# Patient Record
Sex: MALE | Race: WHITE | NOT HISPANIC OR LATINO | Employment: OTHER | URBAN - METROPOLITAN AREA
[De-identification: names, ages, dates, MRNs, and addresses within clinical notes are randomized per-mention and may not be internally consistent; named-entity substitution may affect disease eponyms.]

---

## 2017-09-14 ENCOUNTER — HOSPITAL ENCOUNTER (OUTPATIENT)
Facility: MEDICAL CENTER | Age: 57
End: 2017-09-14
Payer: COMMERCIAL

## 2017-09-14 LAB
ALBUMIN SERPL BCP-MCNC: 4 G/DL (ref 3.2–4.9)
ALBUMIN/GLOB SERPL: 1.5 G/DL
ALP SERPL-CCNC: 63 U/L (ref 30–99)
ALT SERPL-CCNC: 24 U/L (ref 2–50)
ANION GAP SERPL CALC-SCNC: 8 MMOL/L (ref 0–11.9)
AST SERPL-CCNC: 17 U/L (ref 12–45)
BDY FAT % MEASURED: 28.6 %
BILIRUB SERPL-MCNC: 0.4 MG/DL (ref 0.1–1.5)
BP DIAS: 88 MMHG
BP SYS: 120 MMHG
BUN SERPL-MCNC: 19 MG/DL (ref 8–22)
CALCIUM SERPL-MCNC: 9.3 MG/DL (ref 8.5–10.5)
CHLORIDE SERPL-SCNC: 107 MMOL/L (ref 96–112)
CHOLEST SERPL-MCNC: 204 MG/DL (ref 100–199)
CO2 SERPL-SCNC: 27 MMOL/L (ref 20–33)
CREAT SERPL-MCNC: 0.9 MG/DL (ref 0.5–1.4)
DIABETES HTDIA: NO
EVENT NAME HTEVT: NORMAL
GFR SERPL CREATININE-BSD FRML MDRD: >60 ML/MIN/1.73 M 2
GLOBULIN SER CALC-MCNC: 2.7 G/DL (ref 1.9–3.5)
GLUCOSE SERPL-MCNC: 98 MG/DL (ref 65–99)
HDLC SERPL-MCNC: 60 MG/DL
HYPERTENSION HTHYP: NO
LDLC SERPL CALC-MCNC: 116 MG/DL
POTASSIUM SERPL-SCNC: 4.5 MMOL/L (ref 3.6–5.5)
PROT SERPL-MCNC: 6.7 G/DL (ref 6–8.2)
SCREENING LOC CITY HTCIT: NORMAL
SCREENING LOC STATE HTSTA: NORMAL
SCREENING LOCATION HTLOC: NORMAL
SODIUM SERPL-SCNC: 142 MMOL/L (ref 135–145)
SUBSCRIBER ID HTSID: NORMAL
TRIGL SERPL-MCNC: 141 MG/DL (ref 0–149)

## 2018-09-13 ENCOUNTER — HOSPITAL ENCOUNTER (OUTPATIENT)
Facility: MEDICAL CENTER | Age: 58
End: 2018-09-13
Payer: COMMERCIAL

## 2018-09-14 LAB
BDY FAT % MEASURED: 31.6 %
BP DIAS: 78 MMHG
BP SYS: 118 MMHG
DIABETES HTDIA: NO
EVENT NAME HTEVT: NORMAL
HYPERTENSION HTHYP: NO
SCREENING LOC CITY HTCIT: NORMAL
SCREENING LOC STATE HTSTA: NORMAL
SCREENING LOCATION HTLOC: NORMAL
SUBSCRIBER ID HTSID: NORMAL

## 2018-09-15 LAB
CHOLEST SERPL-MCNC: 260 MG/DL (ref 100–199)
FASTING STATUS PATIENT QL REPORTED: NORMAL
GLUCOSE SERPL-MCNC: 94 MG/DL (ref 65–99)
HDLC SERPL-MCNC: 54 MG/DL
LDLC SERPL CALC-MCNC: 157 MG/DL
TRIGL SERPL-MCNC: 246 MG/DL (ref 0–149)

## 2024-09-04 ENCOUNTER — APPOINTMENT (RX ONLY)
Dept: URBAN - METROPOLITAN AREA CLINIC 38 | Facility: CLINIC | Age: 64
Setting detail: DERMATOLOGY
End: 2024-09-04

## 2024-09-04 DIAGNOSIS — D18.0 HEMANGIOMA: ICD-10-CM

## 2024-09-04 DIAGNOSIS — D22 MELANOCYTIC NEVI: ICD-10-CM

## 2024-09-04 DIAGNOSIS — L82.1 OTHER SEBORRHEIC KERATOSIS: ICD-10-CM

## 2024-09-04 DIAGNOSIS — T07XXXA ABRASION OR FRICTION BURN OF OTHER, MULTIPLE, AND UNSPECIFIED SITES, WITHOUT MENTION OF INFECTION: ICD-10-CM

## 2024-09-04 DIAGNOSIS — L81.4 OTHER MELANIN HYPERPIGMENTATION: ICD-10-CM

## 2024-09-04 DIAGNOSIS — D69.2 OTHER NONTHROMBOCYTOPENIC PURPURA: ICD-10-CM

## 2024-09-04 DIAGNOSIS — Z71.89 OTHER SPECIFIED COUNSELING: ICD-10-CM

## 2024-09-04 PROBLEM — D18.01 HEMANGIOMA OF SKIN AND SUBCUTANEOUS TISSUE: Status: ACTIVE | Noted: 2024-09-04

## 2024-09-04 PROBLEM — S80.921A UNSPECIFIED SUPERFICIAL INJURY OF RIGHT LOWER LEG, INITIAL ENCOUNTER: Status: ACTIVE | Noted: 2024-09-04

## 2024-09-04 PROBLEM — D22.5 MELANOCYTIC NEVI OF TRUNK: Status: ACTIVE | Noted: 2024-09-04

## 2024-09-04 PROCEDURE — 99203 OFFICE O/P NEW LOW 30 MIN: CPT

## 2024-09-04 PROCEDURE — ? COUNSELING

## 2024-09-04 ASSESSMENT — LOCATION SIMPLE DESCRIPTION DERM
LOCATION SIMPLE: RIGHT FOREARM
LOCATION SIMPLE: POSTERIOR NECK
LOCATION SIMPLE: INFERIOR FOREHEAD
LOCATION SIMPLE: RIGHT FOREHEAD
LOCATION SIMPLE: LEFT 2ND TOE
LOCATION SIMPLE: LEFT FOREARM
LOCATION SIMPLE: LEFT ANTERIOR NECK
LOCATION SIMPLE: RIGHT PRETIBIAL REGION
LOCATION SIMPLE: RIGHT 2ND TOE
LOCATION SIMPLE: SCALP
LOCATION SIMPLE: ABDOMEN
LOCATION SIMPLE: RIGHT UPPER BACK
LOCATION SIMPLE: LEFT LOWER BACK

## 2024-09-04 ASSESSMENT — LOCATION DETAILED DESCRIPTION DERM
LOCATION DETAILED: LEFT SUPERIOR LATERAL LOWER BACK
LOCATION DETAILED: RIGHT 2ND TOENAIL
LOCATION DETAILED: RIGHT INFERIOR UPPER BACK
LOCATION DETAILED: LEFT DISTAL DORSAL FOREARM
LOCATION DETAILED: RIGHT PROXIMAL PRETIBIAL REGION
LOCATION DETAILED: RIGHT PROXIMAL DORSAL FOREARM
LOCATION DETAILED: RIGHT MEDIAL TRAPEZIAL NECK
LOCATION DETAILED: PERIUMBILICAL SKIN
LOCATION DETAILED: RIGHT SUPERIOR FOREHEAD
LOCATION DETAILED: LEFT CLAVICULAR NECK
LOCATION DETAILED: LEFT 2ND TOENAIL
LOCATION DETAILED: INFERIOR MID FOREHEAD
LOCATION DETAILED: LEFT SUPERIOR PARIETAL SCALP

## 2024-09-04 ASSESSMENT — LOCATION ZONE DERM
LOCATION ZONE: TRUNK
LOCATION ZONE: TOENAIL
LOCATION ZONE: ARM
LOCATION ZONE: NECK
LOCATION ZONE: FACE
LOCATION ZONE: SCALP
LOCATION ZONE: LEG

## 2024-11-03 ENCOUNTER — HOSPITAL ENCOUNTER (OUTPATIENT)
Facility: MEDICAL CENTER | Age: 64
End: 2024-11-04
Attending: HOSPITALIST | Admitting: HOSPITALIST
Payer: COMMERCIAL

## 2024-11-03 PROBLEM — R07.89 ATYPICAL CHEST PAIN: Status: ACTIVE | Noted: 2024-11-03

## 2024-11-03 PROBLEM — R07.9 CHEST PAIN: Status: ACTIVE | Noted: 2024-11-03

## 2024-11-03 LAB
ANION GAP SERPL CALC-SCNC: 9 MMOL/L (ref 7–16)
BUN SERPL-MCNC: 28 MG/DL (ref 8–22)
CALCIUM SERPL-MCNC: 9 MG/DL (ref 8.4–10.2)
CHLORIDE SERPL-SCNC: 104 MMOL/L (ref 96–112)
CO2 SERPL-SCNC: 23 MMOL/L (ref 20–33)
CREAT SERPL-MCNC: 1.03 MG/DL (ref 0.5–1.4)
GFR SERPLBLD CREATININE-BSD FMLA CKD-EPI: 81 ML/MIN/1.73 M 2
GLUCOSE SERPL-MCNC: 114 MG/DL (ref 65–99)
POTASSIUM SERPL-SCNC: 4.6 MMOL/L (ref 3.6–5.5)
SODIUM SERPL-SCNC: 136 MMOL/L (ref 135–145)
TROPONIN T SERPL-MCNC: 107 NG/L (ref 6–19)

## 2024-11-03 PROCEDURE — 94760 N-INVAS EAR/PLS OXIMETRY 1: CPT

## 2024-11-03 PROCEDURE — G0378 HOSPITAL OBSERVATION PER HR: HCPCS

## 2024-11-03 PROCEDURE — 80048 BASIC METABOLIC PNL TOTAL CA: CPT

## 2024-11-03 PROCEDURE — 99292 CRITICAL CARE ADDL 30 MIN: CPT | Performed by: HOSPITALIST

## 2024-11-03 PROCEDURE — 84484 ASSAY OF TROPONIN QUANT: CPT

## 2024-11-03 PROCEDURE — 93005 ELECTROCARDIOGRAM TRACING: CPT | Performed by: HOSPITALIST

## 2024-11-03 PROCEDURE — 36415 COLL VENOUS BLD VENIPUNCTURE: CPT

## 2024-11-03 PROCEDURE — 96375 TX/PRO/DX INJ NEW DRUG ADDON: CPT

## 2024-11-03 PROCEDURE — 700111 HCHG RX REV CODE 636 W/ 250 OVERRIDE (IP): Mod: JZ | Performed by: HOSPITALIST

## 2024-11-03 PROCEDURE — 99291 CRITICAL CARE FIRST HOUR: CPT | Performed by: HOSPITALIST

## 2024-11-03 PROCEDURE — 93005 ELECTROCARDIOGRAM TRACING: CPT | Performed by: EMERGENCY MEDICINE

## 2024-11-03 PROCEDURE — 96365 THER/PROPH/DIAG IV INF INIT: CPT

## 2024-11-03 RX ORDER — PROCHLORPERAZINE EDISYLATE 5 MG/ML
5-10 INJECTION INTRAMUSCULAR; INTRAVENOUS EVERY 4 HOURS PRN
Status: DISCONTINUED | OUTPATIENT
Start: 2024-11-03 | End: 2024-11-04 | Stop reason: HOSPADM

## 2024-11-03 RX ORDER — MORPHINE SULFATE 4 MG/ML
2 INJECTION INTRAVENOUS
Status: DISCONTINUED | OUTPATIENT
Start: 2024-11-03 | End: 2024-11-04

## 2024-11-03 RX ORDER — ACETAMINOPHEN 325 MG/1
650 TABLET ORAL EVERY 6 HOURS PRN
Status: CANCELLED | OUTPATIENT
Start: 2024-11-03

## 2024-11-03 RX ORDER — ATORVASTATIN CALCIUM 20 MG/1
20 TABLET, FILM COATED ORAL DAILY
Status: ON HOLD | COMMUNITY
End: 2024-11-05

## 2024-11-03 RX ORDER — PROMETHAZINE HYDROCHLORIDE 25 MG/1
12.5-25 SUPPOSITORY RECTAL EVERY 4 HOURS PRN
Status: DISCONTINUED | OUTPATIENT
Start: 2024-11-03 | End: 2024-11-04 | Stop reason: HOSPADM

## 2024-11-03 RX ORDER — ASPIRIN 81 MG/1
81 TABLET ORAL DAILY
COMMUNITY

## 2024-11-03 RX ORDER — HEPARIN SODIUM 5000 [USP'U]/100ML
0-30 INJECTION, SOLUTION INTRAVENOUS CONTINUOUS
Status: CANCELLED | OUTPATIENT
Start: 2024-11-04

## 2024-11-03 RX ORDER — ONDANSETRON 2 MG/ML
4 INJECTION INTRAMUSCULAR; INTRAVENOUS EVERY 4 HOURS PRN
Status: DISCONTINUED | OUTPATIENT
Start: 2024-11-03 | End: 2024-11-04

## 2024-11-03 RX ORDER — HEPARIN SODIUM 1000 [USP'U]/ML
2000 INJECTION, SOLUTION INTRAVENOUS; SUBCUTANEOUS PRN
Status: DISCONTINUED | OUTPATIENT
Start: 2024-11-03 | End: 2024-11-04

## 2024-11-03 RX ORDER — IBUPROFEN 200 MG
600 TABLET ORAL
Status: ON HOLD | COMMUNITY
End: 2024-11-05

## 2024-11-03 RX ORDER — NITROGLYCERIN 20 MG/100ML
0-200 INJECTION INTRAVENOUS CONTINUOUS
Status: DISCONTINUED | OUTPATIENT
Start: 2024-11-03 | End: 2024-11-04

## 2024-11-03 RX ORDER — PROMETHAZINE HYDROCHLORIDE 25 MG/1
12.5-25 TABLET ORAL EVERY 4 HOURS PRN
Status: DISCONTINUED | OUTPATIENT
Start: 2024-11-03 | End: 2024-11-04 | Stop reason: HOSPADM

## 2024-11-03 RX ORDER — METOPROLOL TARTRATE 50 MG
50 TABLET ORAL ONCE
Status: DISCONTINUED | OUTPATIENT
Start: 2024-11-04 | End: 2024-11-04 | Stop reason: HOSPADM

## 2024-11-03 RX ORDER — HEPARIN SODIUM 1000 [USP'U]/ML
4000 INJECTION, SOLUTION INTRAVENOUS; SUBCUTANEOUS ONCE
Status: DISCONTINUED | OUTPATIENT
Start: 2024-11-04 | End: 2024-11-04

## 2024-11-03 RX ORDER — ASPIRIN 81 MG/1
81 TABLET ORAL DAILY
Status: DISCONTINUED | OUTPATIENT
Start: 2024-11-04 | End: 2024-11-04

## 2024-11-03 RX ORDER — ONDANSETRON 2 MG/ML
4 INJECTION INTRAMUSCULAR; INTRAVENOUS EVERY 4 HOURS PRN
Status: CANCELLED | OUTPATIENT
Start: 2024-11-03

## 2024-11-03 RX ORDER — HEPARIN SODIUM 5000 [USP'U]/100ML
0-30 INJECTION, SOLUTION INTRAVENOUS CONTINUOUS
Status: DISCONTINUED | OUTPATIENT
Start: 2024-11-04 | End: 2024-11-04

## 2024-11-03 RX ORDER — ACETAMINOPHEN 325 MG/1
650 TABLET ORAL EVERY 6 HOURS PRN
Status: DISCONTINUED | OUTPATIENT
Start: 2024-11-03 | End: 2024-11-04

## 2024-11-03 RX ORDER — MORPHINE SULFATE 4 MG/ML
2 INJECTION INTRAVENOUS
Status: CANCELLED | OUTPATIENT
Start: 2024-11-03

## 2024-11-03 RX ORDER — ASPIRIN 81 MG/1
81 TABLET ORAL DAILY
Status: CANCELLED | OUTPATIENT
Start: 2024-11-04

## 2024-11-03 RX ORDER — MORPHINE SULFATE 4 MG/ML
4 INJECTION INTRAVENOUS
Status: CANCELLED | OUTPATIENT
Start: 2024-11-03

## 2024-11-03 RX ORDER — NITROGLYCERIN 0.4 MG/1
0.4 TABLET SUBLINGUAL
Status: DISCONTINUED | OUTPATIENT
Start: 2024-11-03 | End: 2024-11-03

## 2024-11-03 RX ORDER — ONDANSETRON 4 MG/1
4 TABLET, ORALLY DISINTEGRATING ORAL EVERY 4 HOURS PRN
Status: CANCELLED | OUTPATIENT
Start: 2024-11-03

## 2024-11-03 RX ORDER — ONDANSETRON 4 MG/1
4 TABLET, ORALLY DISINTEGRATING ORAL EVERY 4 HOURS PRN
Status: DISCONTINUED | OUTPATIENT
Start: 2024-11-03 | End: 2024-11-04

## 2024-11-03 RX ORDER — METOPROLOL TARTRATE 1 MG/ML
5 INJECTION, SOLUTION INTRAVENOUS
Status: DISCONTINUED | OUTPATIENT
Start: 2024-11-03 | End: 2024-11-04 | Stop reason: HOSPADM

## 2024-11-03 RX ORDER — NITROGLYCERIN 20 MG/100ML
0-200 INJECTION INTRAVENOUS CONTINUOUS
Status: CANCELLED | OUTPATIENT
Start: 2024-11-03

## 2024-11-03 RX ORDER — LOSARTAN POTASSIUM 50 MG/1
50 TABLET ORAL NIGHTLY
COMMUNITY
End: 2024-12-02 | Stop reason: SDUPTHER

## 2024-11-03 RX ORDER — MORPHINE SULFATE 4 MG/ML
4 INJECTION INTRAVENOUS
Status: DISCONTINUED | OUTPATIENT
Start: 2024-11-03 | End: 2024-11-04

## 2024-11-03 RX ORDER — HEPARIN SODIUM 1000 [USP'U]/ML
4000 INJECTION, SOLUTION INTRAVENOUS; SUBCUTANEOUS ONCE
Status: CANCELLED | OUTPATIENT
Start: 2024-11-04 | End: 2024-11-05

## 2024-11-03 RX ORDER — HEPARIN SODIUM 1000 [USP'U]/ML
2000 INJECTION, SOLUTION INTRAVENOUS; SUBCUTANEOUS PRN
Status: CANCELLED | OUTPATIENT
Start: 2024-11-03

## 2024-11-03 RX ORDER — MONTELUKAST SODIUM 10 MG/1
10 TABLET ORAL DAILY
COMMUNITY

## 2024-11-03 RX ORDER — ATORVASTATIN CALCIUM 20 MG/1
20 TABLET, FILM COATED ORAL DAILY
Status: CANCELLED | OUTPATIENT
Start: 2024-11-04

## 2024-11-03 RX ADMIN — MORPHINE SULFATE 2 MG: 4 INJECTION, SOLUTION INTRAMUSCULAR; INTRAVENOUS at 22:49

## 2024-11-03 RX ADMIN — NITROGLYCERIN 10 MCG/MIN: 20 INJECTION INTRAVENOUS at 23:51

## 2024-11-03 SDOH — ECONOMIC STABILITY: TRANSPORTATION INSECURITY
IN THE PAST 12 MONTHS, HAS THE LACK OF TRANSPORTATION KEPT YOU FROM MEDICAL APPOINTMENTS OR FROM GETTING MEDICATIONS?: NO

## 2024-11-03 SDOH — ECONOMIC STABILITY: TRANSPORTATION INSECURITY
IN THE PAST 12 MONTHS, HAS LACK OF RELIABLE TRANSPORTATION KEPT YOU FROM MEDICAL APPOINTMENTS, MEETINGS, WORK OR FROM GETTING THINGS NEEDED FOR DAILY LIVING?: NO

## 2024-11-03 ASSESSMENT — PAIN DESCRIPTION - PAIN TYPE
TYPE: ACUTE PAIN

## 2024-11-03 ASSESSMENT — COGNITIVE AND FUNCTIONAL STATUS - GENERAL
MOBILITY SCORE: 23
DAILY ACTIVITIY SCORE: 24
SUGGESTED CMS G CODE MODIFIER DAILY ACTIVITY: CH
CLIMB 3 TO 5 STEPS WITH RAILING: A LITTLE
SUGGESTED CMS G CODE MODIFIER MOBILITY: CI

## 2024-11-03 ASSESSMENT — LIFESTYLE VARIABLES
CONSUMPTION TOTAL: NEGATIVE
AVERAGE NUMBER OF DAYS PER WEEK YOU HAVE A DRINK CONTAINING ALCOHOL: 0
DOES PATIENT WANT TO STOP DRINKING: NO
TOTAL SCORE: 0
HOW MANY TIMES IN THE PAST YEAR HAVE YOU HAD 5 OR MORE DRINKS IN A DAY: 0
ALCOHOL_USE: NO
TOTAL SCORE: 0
TOTAL SCORE: 0
HAVE YOU EVER FELT YOU SHOULD CUT DOWN ON YOUR DRINKING: NO
HAVE PEOPLE ANNOYED YOU BY CRITICIZING YOUR DRINKING: NO
EVER FELT BAD OR GUILTY ABOUT YOUR DRINKING: NO
ON A TYPICAL DAY WHEN YOU DRINK ALCOHOL HOW MANY DRINKS DO YOU HAVE: 0
EVER HAD A DRINK FIRST THING IN THE MORNING TO STEADY YOUR NERVES TO GET RID OF A HANGOVER: NO

## 2024-11-03 ASSESSMENT — SOCIAL DETERMINANTS OF HEALTH (SDOH)

## 2024-11-03 ASSESSMENT — PATIENT HEALTH QUESTIONNAIRE - PHQ9
SUM OF ALL RESPONSES TO PHQ9 QUESTIONS 1 AND 2: 0
2. FEELING DOWN, DEPRESSED, IRRITABLE, OR HOPELESS: NOT AT ALL
1. LITTLE INTEREST OR PLEASURE IN DOING THINGS: NOT AT ALL

## 2024-11-04 ENCOUNTER — APPOINTMENT (OUTPATIENT)
Dept: CARDIOLOGY | Facility: MEDICAL CENTER | Age: 64
DRG: 322 | End: 2024-11-04
Attending: EMERGENCY MEDICINE
Payer: COMMERCIAL

## 2024-11-04 ENCOUNTER — HOSPITAL ENCOUNTER (INPATIENT)
Facility: MEDICAL CENTER | Age: 64
LOS: 1 days | DRG: 322 | End: 2024-11-05
Attending: EMERGENCY MEDICINE | Admitting: HOSPITALIST
Payer: COMMERCIAL

## 2024-11-04 ENCOUNTER — APPOINTMENT (OUTPATIENT)
Dept: CARDIOLOGY | Facility: MEDICAL CENTER | Age: 64
DRG: 322 | End: 2024-11-04
Attending: INTERNAL MEDICINE
Payer: COMMERCIAL

## 2024-11-04 VITALS
WEIGHT: 228.4 LBS | TEMPERATURE: 97.3 F | RESPIRATION RATE: 20 BRPM | HEART RATE: 71 BPM | BODY MASS INDEX: 32.7 KG/M2 | SYSTOLIC BLOOD PRESSURE: 156 MMHG | DIASTOLIC BLOOD PRESSURE: 93 MMHG | HEIGHT: 70 IN | OXYGEN SATURATION: 96 %

## 2024-11-04 DIAGNOSIS — I21.4 NSTEMI, INITIAL EPISODE OF CARE (HCC): ICD-10-CM

## 2024-11-04 DIAGNOSIS — I21.4 NSTEMI (NON-ST ELEVATED MYOCARDIAL INFARCTION) (HCC): ICD-10-CM

## 2024-11-04 PROBLEM — E78.5 HLD (HYPERLIPIDEMIA): Status: ACTIVE | Noted: 2024-11-04

## 2024-11-04 PROBLEM — I10 HTN (HYPERTENSION): Status: ACTIVE | Noted: 2024-11-04

## 2024-11-04 PROBLEM — R07.9 CHEST PAIN: Status: RESOLVED | Noted: 2024-11-03 | Resolved: 2024-11-04

## 2024-11-04 PROBLEM — R73.03 PREDIABETES: Status: ACTIVE | Noted: 2024-11-04

## 2024-11-04 PROBLEM — N17.9 AKI (ACUTE KIDNEY INJURY) (HCC): Status: ACTIVE | Noted: 2024-11-04

## 2024-11-04 LAB
ACT BLD: 183 SEC (ref 74–137)
ACT BLD: 232 SEC (ref 74–137)
ANION GAP SERPL CALC-SCNC: 11 MMOL/L (ref 7–16)
APTT PPP: 22.7 SEC (ref 24.7–36)
BUN SERPL-MCNC: 30 MG/DL (ref 8–22)
CALCIUM SERPL-MCNC: 8.7 MG/DL (ref 8.5–10.5)
CHLORIDE SERPL-SCNC: 106 MMOL/L (ref 96–112)
CHOLEST SERPL-MCNC: 161 MG/DL (ref 100–199)
CO2 SERPL-SCNC: 22 MMOL/L (ref 20–33)
CREAT SERPL-MCNC: 0.93 MG/DL (ref 0.5–1.4)
EKG IMPRESSION: NORMAL
GFR SERPLBLD CREATININE-BSD FMLA CKD-EPI: 91 ML/MIN/1.73 M 2
GLUCOSE SERPL-MCNC: 119 MG/DL (ref 65–99)
HDLC SERPL-MCNC: 47 MG/DL
INR PPP: 0.99 (ref 0.87–1.13)
LDLC SERPL CALC-MCNC: 88 MG/DL
POTASSIUM SERPL-SCNC: 4.5 MMOL/L (ref 3.6–5.5)
PROTHROMBIN TIME: 13.4 SEC (ref 12–14.6)
SODIUM SERPL-SCNC: 139 MMOL/L (ref 135–145)
TRIGL SERPL-MCNC: 131 MG/DL (ref 0–149)
TROPONIN T SERPL-MCNC: 234 NG/L (ref 6–19)
TROPONIN T SERPL-MCNC: 259 NG/L (ref 6–19)
TROPONIN T SERPL-MCNC: 475 NG/L (ref 6–19)
UFH PPP CHRO-ACNC: <0.1 IU/ML
UFH PPP CHRO-ACNC: >1.1 IU/ML

## 2024-11-04 PROCEDURE — 700111 HCHG RX REV CODE 636 W/ 250 OVERRIDE (IP): Mod: JZ | Performed by: HOSPITALIST

## 2024-11-04 PROCEDURE — 80061 LIPID PANEL: CPT

## 2024-11-04 PROCEDURE — 700102 HCHG RX REV CODE 250 W/ 637 OVERRIDE(OP): Performed by: INTERNAL MEDICINE

## 2024-11-04 PROCEDURE — 770000 HCHG ROOM/CARE - INTERMEDIATE ICU *

## 2024-11-04 PROCEDURE — B2111ZZ FLUOROSCOPY OF MULTIPLE CORONARY ARTERIES USING LOW OSMOLAR CONTRAST: ICD-10-PCS | Performed by: INTERNAL MEDICINE

## 2024-11-04 PROCEDURE — 85520 HEPARIN ASSAY: CPT

## 2024-11-04 PROCEDURE — 85610 PROTHROMBIN TIME: CPT

## 2024-11-04 PROCEDURE — 93005 ELECTROCARDIOGRAM TRACING: CPT | Performed by: INTERNAL MEDICINE

## 2024-11-04 PROCEDURE — 99238 HOSP IP/OBS DSCHRG MGMT 30/<: CPT | Performed by: HOSPITALIST

## 2024-11-04 PROCEDURE — 84484 ASSAY OF TROPONIN QUANT: CPT | Mod: 91

## 2024-11-04 PROCEDURE — 80048 BASIC METABOLIC PNL TOTAL CA: CPT

## 2024-11-04 PROCEDURE — 93005 ELECTROCARDIOGRAM TRACING: CPT | Performed by: HOSPITALIST

## 2024-11-04 PROCEDURE — 93010 ELECTROCARDIOGRAM REPORT: CPT | Mod: 59 | Performed by: INTERNAL MEDICINE

## 2024-11-04 PROCEDURE — G0378 HOSPITAL OBSERVATION PER HR: HCPCS

## 2024-11-04 PROCEDURE — 93010 ELECTROCARDIOGRAM REPORT: CPT | Mod: 59,76 | Performed by: INTERNAL MEDICINE

## 2024-11-04 PROCEDURE — 700101 HCHG RX REV CODE 250

## 2024-11-04 PROCEDURE — 4A023N7 MEASUREMENT OF CARDIAC SAMPLING AND PRESSURE, LEFT HEART, PERCUTANEOUS APPROACH: ICD-10-PCS | Performed by: INTERNAL MEDICINE

## 2024-11-04 PROCEDURE — 700111 HCHG RX REV CODE 636 W/ 250 OVERRIDE (IP)

## 2024-11-04 PROCEDURE — 700105 HCHG RX REV CODE 258: Performed by: INTERNAL MEDICINE

## 2024-11-04 PROCEDURE — 93010 ELECTROCARDIOGRAM REPORT: CPT | Mod: 77,59 | Performed by: INTERNAL MEDICINE

## 2024-11-04 PROCEDURE — A9270 NON-COVERED ITEM OR SERVICE: HCPCS

## 2024-11-04 PROCEDURE — 99153 MOD SED SAME PHYS/QHP EA: CPT

## 2024-11-04 PROCEDURE — 93306 TTE W/DOPPLER COMPLETE: CPT

## 2024-11-04 PROCEDURE — B240ZZ3 ULTRASONOGRAPHY OF SINGLE CORONARY ARTERY, INTRAVASCULAR: ICD-10-PCS | Performed by: INTERNAL MEDICINE

## 2024-11-04 PROCEDURE — 700102 HCHG RX REV CODE 250 W/ 637 OVERRIDE(OP)

## 2024-11-04 PROCEDURE — 85730 THROMBOPLASTIN TIME PARTIAL: CPT

## 2024-11-04 PROCEDURE — A9270 NON-COVERED ITEM OR SERVICE: HCPCS | Performed by: INTERNAL MEDICINE

## 2024-11-04 PROCEDURE — 700117 HCHG RX CONTRAST REV CODE 255: Performed by: INTERNAL MEDICINE

## 2024-11-04 PROCEDURE — 027035Z DILATION OF CORONARY ARTERY, ONE ARTERY WITH TWO DRUG-ELUTING INTRALUMINAL DEVICES, PERCUTANEOUS APPROACH: ICD-10-PCS | Performed by: INTERNAL MEDICINE

## 2024-11-04 PROCEDURE — 700117 HCHG RX CONTRAST REV CODE 255: Performed by: EMERGENCY MEDICINE

## 2024-11-04 PROCEDURE — 99291 CRITICAL CARE FIRST HOUR: CPT | Performed by: INTERNAL MEDICINE

## 2024-11-04 PROCEDURE — 99291 CRITICAL CARE FIRST HOUR: CPT | Mod: GC | Performed by: EMERGENCY MEDICINE

## 2024-11-04 PROCEDURE — 85347 COAGULATION TIME ACTIVATED: CPT

## 2024-11-04 PROCEDURE — 99292 CRITICAL CARE ADDL 30 MIN: CPT | Performed by: STUDENT IN AN ORGANIZED HEALTH CARE EDUCATION/TRAINING PROGRAM

## 2024-11-04 PROCEDURE — 99223 1ST HOSP IP/OBS HIGH 75: CPT | Performed by: HOSPITALIST

## 2024-11-04 RX ORDER — ACETAMINOPHEN 325 MG/1
650 TABLET ORAL EVERY 6 HOURS PRN
Status: DISCONTINUED | OUTPATIENT
Start: 2024-11-04 | End: 2024-11-05 | Stop reason: HOSPADM

## 2024-11-04 RX ORDER — METOPROLOL TARTRATE 50 MG
50 TABLET ORAL 2 TIMES DAILY
Status: DISCONTINUED | OUTPATIENT
Start: 2024-11-04 | End: 2024-11-05 | Stop reason: HOSPADM

## 2024-11-04 RX ORDER — ASPIRIN 81 MG/1
325 TABLET ORAL DAILY
Status: DISCONTINUED | OUTPATIENT
Start: 2024-11-04 | End: 2024-11-04

## 2024-11-04 RX ORDER — ATORVASTATIN CALCIUM 20 MG/1
20 TABLET, FILM COATED ORAL DAILY
Status: DISCONTINUED | OUTPATIENT
Start: 2024-11-04 | End: 2024-11-04

## 2024-11-04 RX ORDER — PRASUGREL 10 MG/1
10 TABLET, FILM COATED ORAL DAILY
Status: DISCONTINUED | OUTPATIENT
Start: 2024-11-05 | End: 2024-11-05 | Stop reason: HOSPADM

## 2024-11-04 RX ORDER — PRASUGREL 10 MG/1
TABLET, FILM COATED ORAL
Status: COMPLETED
Start: 2024-11-04 | End: 2024-11-04

## 2024-11-04 RX ORDER — ALBUTEROL SULFATE 90 UG/1
1-2 INHALANT RESPIRATORY (INHALATION) EVERY 6 HOURS PRN
COMMUNITY

## 2024-11-04 RX ORDER — ASPIRIN 81 MG/1
81 TABLET ORAL DAILY
Status: DISCONTINUED | OUTPATIENT
Start: 2024-11-04 | End: 2024-11-04

## 2024-11-04 RX ORDER — ATORVASTATIN CALCIUM 80 MG/1
80 TABLET, FILM COATED ORAL DAILY
Status: DISCONTINUED | OUTPATIENT
Start: 2024-11-04 | End: 2024-11-05 | Stop reason: HOSPADM

## 2024-11-04 RX ORDER — NITROGLYCERIN 20 MG/100ML
0-200 INJECTION INTRAVENOUS CONTINUOUS
Status: DISCONTINUED | OUTPATIENT
Start: 2024-11-04 | End: 2024-11-05 | Stop reason: HOSPADM

## 2024-11-04 RX ORDER — PRASUGREL 10 MG/1
60 TABLET, FILM COATED ORAL ONCE
Status: DISCONTINUED | OUTPATIENT
Start: 2024-11-04 | End: 2024-11-04

## 2024-11-04 RX ORDER — HEPARIN SODIUM 1000 [USP'U]/ML
4000 INJECTION, SOLUTION INTRAVENOUS; SUBCUTANEOUS ONCE
Status: COMPLETED | OUTPATIENT
Start: 2024-11-04 | End: 2024-11-04

## 2024-11-04 RX ORDER — HEPARIN SODIUM 1000 [USP'U]/ML
INJECTION, SOLUTION INTRAVENOUS; SUBCUTANEOUS
Status: COMPLETED
Start: 2024-11-04 | End: 2024-11-04

## 2024-11-04 RX ORDER — MIDAZOLAM HYDROCHLORIDE 1 MG/ML
INJECTION INTRAMUSCULAR; INTRAVENOUS
Status: COMPLETED
Start: 2024-11-04 | End: 2024-11-04

## 2024-11-04 RX ORDER — MORPHINE SULFATE 4 MG/ML
4 INJECTION INTRAVENOUS
Status: DISCONTINUED | OUTPATIENT
Start: 2024-11-04 | End: 2024-11-05 | Stop reason: HOSPADM

## 2024-11-04 RX ORDER — MORPHINE SULFATE 4 MG/ML
2 INJECTION INTRAVENOUS
Status: DISCONTINUED | OUTPATIENT
Start: 2024-11-04 | End: 2024-11-05 | Stop reason: HOSPADM

## 2024-11-04 RX ORDER — LIDOCAINE HYDROCHLORIDE 20 MG/ML
INJECTION, SOLUTION INFILTRATION; PERINEURAL
Status: COMPLETED
Start: 2024-11-04 | End: 2024-11-04

## 2024-11-04 RX ORDER — ONDANSETRON 2 MG/ML
4 INJECTION INTRAMUSCULAR; INTRAVENOUS EVERY 4 HOURS PRN
Status: DISCONTINUED | OUTPATIENT
Start: 2024-11-04 | End: 2024-11-05 | Stop reason: HOSPADM

## 2024-11-04 RX ORDER — HEPARIN SODIUM 200 [USP'U]/100ML
INJECTION, SOLUTION INTRAVENOUS
Status: COMPLETED
Start: 2024-11-04 | End: 2024-11-04

## 2024-11-04 RX ORDER — HEPARIN SODIUM 5000 [USP'U]/100ML
0-30 INJECTION, SOLUTION INTRAVENOUS CONTINUOUS
Status: DISCONTINUED | OUTPATIENT
Start: 2024-11-04 | End: 2024-11-04

## 2024-11-04 RX ORDER — ASPIRIN 81 MG/1
81 TABLET ORAL DAILY
Status: DISCONTINUED | OUTPATIENT
Start: 2024-11-04 | End: 2024-11-05 | Stop reason: HOSPADM

## 2024-11-04 RX ORDER — VERAPAMIL HYDROCHLORIDE 2.5 MG/ML
INJECTION, SOLUTION INTRAVENOUS
Status: COMPLETED
Start: 2024-11-04 | End: 2024-11-04

## 2024-11-04 RX ORDER — AMOXICILLIN 250 MG
1 CAPSULE ORAL DAILY
Status: DISCONTINUED | OUTPATIENT
Start: 2024-11-04 | End: 2024-11-05 | Stop reason: HOSPADM

## 2024-11-04 RX ORDER — SODIUM CHLORIDE 9 MG/ML
1.5 INJECTION, SOLUTION INTRAVENOUS CONTINUOUS
Status: ACTIVE | OUTPATIENT
Start: 2024-11-04 | End: 2024-11-04

## 2024-11-04 RX ORDER — HEPARIN SODIUM 1000 [USP'U]/ML
2000 INJECTION, SOLUTION INTRAVENOUS; SUBCUTANEOUS PRN
Status: DISCONTINUED | OUTPATIENT
Start: 2024-11-04 | End: 2024-11-04

## 2024-11-04 RX ORDER — ONDANSETRON 4 MG/1
4 TABLET, ORALLY DISINTEGRATING ORAL EVERY 4 HOURS PRN
Status: DISCONTINUED | OUTPATIENT
Start: 2024-11-04 | End: 2024-11-05 | Stop reason: HOSPADM

## 2024-11-04 RX ADMIN — ATORVASTATIN CALCIUM 80 MG: 80 TABLET, FILM COATED ORAL at 05:28

## 2024-11-04 RX ADMIN — NITROGLYCERIN 10 ML: 20 INJECTION INTRAVENOUS at 13:37

## 2024-11-04 RX ADMIN — IOHEXOL 64 ML: 350 INJECTION, SOLUTION INTRAVENOUS at 14:45

## 2024-11-04 RX ADMIN — NITROGLYCERIN 100 MCG/MIN: 20 INJECTION INTRAVENOUS at 01:39

## 2024-11-04 RX ADMIN — VERAPAMIL HYDROCHLORIDE 2.5 MG: 2.5 INJECTION, SOLUTION INTRAVENOUS at 13:37

## 2024-11-04 RX ADMIN — MIDAZOLAM HYDROCHLORIDE 2 MG: 1 INJECTION, SOLUTION INTRAMUSCULAR; INTRAVENOUS at 14:28

## 2024-11-04 RX ADMIN — MIDAZOLAM HYDROCHLORIDE 2 MG: 1 INJECTION, SOLUTION INTRAMUSCULAR; INTRAVENOUS at 13:57

## 2024-11-04 RX ADMIN — METOPROLOL TARTRATE 50 MG: 50 TABLET, FILM COATED ORAL at 05:42

## 2024-11-04 RX ADMIN — ONDANSETRON 4 MG: 2 INJECTION INTRAMUSCULAR; INTRAVENOUS at 01:59

## 2024-11-04 RX ADMIN — HEPARIN SODIUM: 1000 INJECTION, SOLUTION INTRAVENOUS; SUBCUTANEOUS at 13:37

## 2024-11-04 RX ADMIN — PRASUGREL 60 MG: 10 TABLET, FILM COATED ORAL at 14:47

## 2024-11-04 RX ADMIN — NITROGLYCERIN 180 MCG/MIN: 20 INJECTION INTRAVENOUS at 07:59

## 2024-11-04 RX ADMIN — LIDOCAINE HYDROCHLORIDE: 20 INJECTION, SOLUTION INFILTRATION; PERINEURAL at 13:37

## 2024-11-04 RX ADMIN — MORPHINE SULFATE 4 MG: 4 INJECTION INTRAVENOUS at 12:55

## 2024-11-04 RX ADMIN — MORPHINE SULFATE 2 MG: 4 INJECTION INTRAVENOUS at 09:39

## 2024-11-04 RX ADMIN — HEPARIN SODIUM 4000 UNITS: 1000 INJECTION, SOLUTION INTRAVENOUS; SUBCUTANEOUS at 01:45

## 2024-11-04 RX ADMIN — NITROGLYCERIN 200 MCG/MIN: 20 INJECTION INTRAVENOUS at 12:25

## 2024-11-04 RX ADMIN — FENTANYL CITRATE 100 MCG: 50 INJECTION, SOLUTION INTRAMUSCULAR; INTRAVENOUS at 14:37

## 2024-11-04 RX ADMIN — HUMAN ALBUMIN MICROSPHERES AND PERFLUTREN 3 ML: 10; .22 INJECTION, SOLUTION INTRAVENOUS at 09:52

## 2024-11-04 RX ADMIN — HEPARIN SODIUM 12 UNITS/KG/HR: 5000 INJECTION, SOLUTION INTRAVENOUS at 01:45

## 2024-11-04 RX ADMIN — HEPARIN SODIUM 2000 UNITS: 200 INJECTION, SOLUTION INTRAVENOUS at 13:38

## 2024-11-04 RX ADMIN — HEPARIN SODIUM: 1000 INJECTION, SOLUTION INTRAVENOUS; SUBCUTANEOUS at 14:45

## 2024-11-04 RX ADMIN — SODIUM CHLORIDE 1.5 ML/KG/HR: 9 INJECTION, SOLUTION INTRAVENOUS at 15:52

## 2024-11-04 RX ADMIN — ASPIRIN 324 MG: 81 TABLET, COATED ORAL at 05:27

## 2024-11-04 RX ADMIN — METOPROLOL TARTRATE 50 MG: 50 TABLET, FILM COATED ORAL at 17:40

## 2024-11-04 ASSESSMENT — PATIENT HEALTH QUESTIONNAIRE - PHQ9
2. FEELING DOWN, DEPRESSED, IRRITABLE, OR HOPELESS: NOT AT ALL
SUM OF ALL RESPONSES TO PHQ9 QUESTIONS 1 AND 2: 0
1. LITTLE INTEREST OR PLEASURE IN DOING THINGS: NOT AT ALL

## 2024-11-04 ASSESSMENT — ENCOUNTER SYMPTOMS
CHEST TIGHTNESS: 0
EYE REDNESS: 0
CHILLS: 0
HEADACHES: 0
SHORTNESS OF BREATH: 1
NAUSEA: 0
WEAKNESS: 0
DOUBLE VISION: 0
COUGH: 0
VOMITING: 0
FEVER: 0
NAUSEA: 1
DIZZINESS: 0
DEPRESSION: 0
WHEEZING: 0
NECK PAIN: 0
WEAKNESS: 0
DIZZINESS: 0
DIAPHORESIS: 1
BRUISES/BLEEDS EASILY: 0
FEVER: 0
NAUSEA: 1
ABDOMINAL PAIN: 0
BLURRED VISION: 0
VOMITING: 0
INSOMNIA: 0
HEADACHES: 1
SHORTNESS OF BREATH: 0
DIAPHORESIS: 1
HEADACHES: 0
PALPITATIONS: 0
PALPITATIONS: 0
SORE THROAT: 0
MYALGIAS: 0

## 2024-11-04 ASSESSMENT — PAIN DESCRIPTION - PAIN TYPE
TYPE: ACUTE PAIN

## 2024-11-04 NOTE — CONSULTS
Interventional cardiology consultation requested by Dr. Murray for cardiac catheterization and possible percutaneous coronary intervention    CC: Chest pain      HPI:  64 y.o. year old patient with chest pain pressure-like sensation improved with nitro.  Moderate intensity, persistent associated with mild shortness of breath.  His troponin is elevated.  Today he continues to have mild chest pressure despite nitroglycerin infusion.    Medications / Drug list prior to admission:  No current facility-administered medications on file prior to encounter.     Current Outpatient Medications on File Prior to Encounter   Medication Sig Dispense Refill    albuterol 108 (90 Base) MCG/ACT Aero Soln inhalation aerosol Inhale 1-2 Puffs every 6 hours as needed for Shortness of Breath.      atorvastatin (LIPITOR) 20 MG Tab Take 20 mg by mouth every day.      montelukast (SINGULAIR) 10 MG Tab Take 10 mg by mouth every day.      aspirin 81 MG EC tablet Take 81 mg by mouth every day.      ibuprofen (MOTRIN) 200 MG Tab Take 600 mg by mouth 1 time a day as needed for Mild Pain. 600 mg = 3 tabs      losartan (COZAAR) 50 MG Tab Take 50 mg by mouth every evening.         Current list of administered Medications:    Current Facility-Administered Medications:     acetaminophen (Tylenol) tablet 650 mg, 650 mg, Oral, Q6HRS PRN, Effie Jesus M.D.    morphine 4 MG/ML injection 2 mg, 2 mg, Intravenous, Q3HRS PRN, Effie Jesus M.D., 2 mg at 11/04/24 0939    morphine 4 MG/ML injection 4 mg, 4 mg, Intravenous, Q3HRS PRN, Effie Jesus M.D., 4 mg at 11/04/24 1255    ondansetron (Zofran ODT) dispertab 4 mg, 4 mg, Oral, Q4HRS PRN, Effie Jesus M.D.    ondansetron (Zofran) syringe/vial injection 4 mg, 4 mg, Intravenous, Q4HRS PRN, Effie Jesus M.D., 4 mg at 11/04/24 0159    nitroglycerin 50 mg in D5W 250 ml infusion, 0-200 mcg/min, Intravenous, Continuous, Effie Jesus M.D., Last Rate:  60 mL/hr at 11/04/24 1225, 200 mcg/min at 11/04/24 1225    metoprolol tartrate (Lopressor) tablet 50 mg, 50 mg, Oral, BID, Edwar Shahid M.D., 50 mg at 11/04/24 0542    atorvastatin (Lipitor) tablet 80 mg, 80 mg, Oral, DAILY, Roxy Way M.D., 80 mg at 11/04/24 0528    senna-docusate (Pericolace Or Senokot S) 8.6-50 MG per tablet 1 Tablet, 1 Tablet, Oral, DAILY, Regis Elizalde M.D.    NS infusion, 1.5 mL/kg/hr, Intravenous, Continuous, Fabián Whittington M.D.    aspirin EC tablet 81 mg, 81 mg, Oral, DAILY, Fabián Whittington M.D.    [START ON 11/5/2024] prasugrel (Effient) tablet 10 mg, 10 mg, Oral, DAILY, Fabián Whittington M.D.    No past medical history on file.    No past surgical history on file.    No family history on file.  Patient family history was personally reviewed, no pertinent family history to current presentation    Social History     Tobacco Use    Smoking status: Never    Smokeless tobacco: Never       ALLERGIES:  Allergies   Allergen Reactions    Lisinopril Cough    Phenobarbital        Review of systems:  A complete review of symptoms was reviewed with patient. This is reviewed in H&P and PMH. ALL OTHERS reviewed and negative    Physical exam:  Patient Vitals for the past 24 hrs:   BP Temp Temp src Pulse Resp SpO2 Height Weight   11/04/24 1300 -- -- -- 70 (!) 22 97 % -- --   11/04/24 1255 -- -- -- 67 17 97 % -- --   11/04/24 1200 107/54 -- -- 65 15 96 % -- --   11/04/24 1100 110/63 -- -- (!) 59 (!) 29 98 % -- --   11/04/24 1000 105/57 -- -- 63 (!) 22 95 % -- --   11/04/24 0915 110/70 -- -- 65 (!) 38 96 % -- --   11/04/24 0900 110/65 -- -- (!) 57 17 96 % -- --   11/04/24 0845 112/67 -- -- 72 (!) 32 92 % -- --   11/04/24 0830 118/64 -- -- (!) 54 17 94 % -- --   11/04/24 0815 114/61 -- -- (!) 58 18 96 % -- --   11/04/24 0800 126/75 -- -- (!) 59 (!) 36 96 % -- --   11/04/24 0745 122/70 -- -- 62 17 94 % -- --   11/04/24 0730 119/69 -- -- 63 15 99 % -- --   11/04/24 0715  "120/71 -- -- (!) 55 14 96 % -- --   11/04/24 0700 125/69 -- -- 60 (!) 21 96 % -- --   11/04/24 0645 130/68 -- -- 64 17 98 % -- --   11/04/24 0630 131/70 -- -- (!) 55 16 96 % -- --   11/04/24 0615 127/71 -- -- 63 19 96 % -- --   11/04/24 0600 124/67 -- -- 60 (!) 21 94 % -- --   11/04/24 0545 126/70 -- -- 62 17 96 % -- --   11/04/24 0530 124/72 -- -- 65 16 97 % -- --   11/04/24 0515 125/65 -- -- 63 15 95 % -- --   11/04/24 0500 113/62 -- -- (!) 58 15 93 % -- --   11/04/24 0445 102/56 -- -- 63 18 97 % -- --   11/04/24 0430 102/59 -- -- 64 14 95 % -- --   11/04/24 0415 95/53 -- -- 65 17 95 % -- --   11/04/24 0400 103/66 36.7 °C (98 °F) Temporal 65 14 93 % -- --   11/04/24 0345 101/59 -- -- 82 20 91 % -- --   11/04/24 0330 100/56 -- -- 68 16 93 % -- --   11/04/24 0315 100/56 -- -- 71 20 92 % -- --   11/04/24 0300 104/59 -- -- 68 16 92 % -- --   11/04/24 0245 104/57 -- -- 67 16 93 % -- --   11/04/24 0230 132/72 -- -- 65 17 92 % -- --   11/04/24 0215 127/89 -- -- 100 15 93 % -- --   11/04/24 0200 133/68 -- -- 68 16 95 % -- --   11/04/24 0145 (!) 143/85 -- -- 68 14 92 % -- --   11/04/24 0138 (!) 179/101 -- -- 70 14 96 % -- --   11/04/24 0130 (!) 179/101 -- -- 70 19 95 % -- --   11/04/24 0115 -- -- -- 64 17 92 % -- --   11/04/24 0100 (!) 159/91 36.6 °C (97.8 °F) Temporal 70 (!) 22 95 % -- --   11/04/24 0050 -- -- -- -- -- -- 1.778 m (5' 10\") 104 kg (229 lb 8 oz)   11/04/24 0000 -- -- -- -- -- -- 1.778 m (5' 10\") --     General: No acute distress.   EYES: no jaundice  HEENT: OP clear   Neck:  No JVD.   CVS: Regular  Resp: Normal respiratory effort, CTAB. No wheezing or crackles/rhonchi.  Abdomen: Soft, ND,  Skin: Grossly nothing acute no obvious rashes  Neurological: Alert, Moves all extremities  Extremities:   [  no ] edema. No cyanosis.       Data:  Laboratory studies personally reviewed by me:  Recent Results (from the past 24 hours)   CBC WITH DIFFERENTIAL    Collection Time: 11/03/24  5:33 PM   Result Value Ref Range    " WBC 9.6 4.0 - 10.0 K/uL    RBC 4.93 4.70 - 6.10 M/uL    Hemoglobin 16.1 14.0 - 18.0 g/dL    Hematocrit 45.4 42.0 - 54.0 %    MCV 92.1 81.0 - 100.0 fL    MCH 32.7 26.0 - 34.0 pg    MCHC 35.5 33.0 - 37.0 g/dL    RDW 12.7 11.5 - 14.5 %    Platelet Count 244 130 - 400 K/uL    MPV 9.0 8.5 - 12.5 fL    Neutrophils-Polys 45 42 - 75 %    Lymphocytes 43 21 - 51 %    Monocytes 8 2 - 12 %    Eosinophils 3 0 - 7 %    Basophils 1 0 - 2 %    Immature Granulocytes 0 0 - 1 %    Neutrophils (Absolute) 4.29 1.50 - 8.00 K/uL    Lymphs (Absolute) 4.08 0.70 - 4.50 K/uL    Monos (Absolute) 0.78 0.10 - 1.00 K/uL    Eos (Absolute) 0.31 0.00 - 0.40 K/uL    Baso (Absolute) 0.10 0.00 - 0.20 K/uL    Immature Granulocytes (abs) 0.02 0.00 - 2.90 K/uL   Basic Metabolic Panel    Collection Time: 24  9:39 PM   Result Value Ref Range    Sodium 136 135 - 145 mmol/L    Potassium 4.6 3.6 - 5.5 mmol/L    Chloride 104 96 - 112 mmol/L    Co2 23 20 - 33 mmol/L    Glucose 114 (H) 65 - 99 mg/dL    Bun 28 (H) 8 - 22 mg/dL    Creatinine 1.03 0.50 - 1.40 mg/dL    Calcium 9.0 8.4 - 10.2 mg/dL    Anion Gap 9.0 7.0 - 16.0   TROPONIN    Collection Time: 24  9:39 PM   Result Value Ref Range    Troponin T 107 (H) 6 - 19 ng/L   ESTIMATED GFR    Collection Time: 24  9:39 PM   Result Value Ref Range    GFR (CKD-EPI) 81 >60 mL/min/1.73 m 2   EKG    Collection Time: 24 10:31 PM   Result Value Ref Range    Report       [facility]    Test Date:  2024  Pt Name:    KEY SHERIDAN              Department: [facility]  MRN:        [id number]                      Room:       [facility]  Gender:     Male                         Technician: [id number]  :        1960                   Requested By:LOYD CHOUDHARY  Order #:    [id number]                    Reading MD: Edwar Shahid MD    Measurements  Intervals                                Axis  Rate:       70                           P:          41  MI:         161                           QRS:        21  QRSD:       99                           T:          54  QT:         421  QTc:        455    Interpretive Statements  Sinus rhythm  Left atrial enlargement  No previous ECG available for comparison  Electronically Signed On 2024 00:24:03 PST by Edwar Shahid MD     EKG    Collection Time: 24 11:34 PM   Result Value Ref Range    Report       [facility]    Test Date:  2024  Pt Name:    KEY SHERIDAN              Department: [facility]  MRN:        [id number]                      Room:       [facility]  Gender:     Male                         Technician: [id number]  :        1960                   Requested By:LIVIER VARGAS  Order #:    [id number]                    Reading MD: Edwar Shahid MD    Measurements  Intervals                                Axis  Rate:       65                           P:          46  IA:         164                          QRS:        23  QRSD:       99                           T:          68  QT:         417  QTc:        434    Interpretive Statements  Sinus rhythm  Ventricular premature complex  Compared to ECG 2024 22:31:50  Ventricular premature complex(es) now present  Atrial abnormality no longer present      Electronically Signed On 2024 00:25:12 PST by Edwar Shahid MD     aPTT    Collection Time: 24 12:00 AM   Result Value Ref Range    APTT 22.7 (L) 24.7 - 36.0 sec   Prothrombin Time    Collection Time: 24 12:00 AM   Result Value Ref Range    PT 13.4 12.0 - 14.6 sec    INR 0.99 0.87 - 1.13   Heparin Xa (Unfractionated)    Collection Time: 24 12:00 AM   Result Value Ref Range    Heparin Xa (UFH) <0.10 IU/mL   EKG in four (4) hours    Collection Time: 24  1:25 AM   Result Value Ref Range    Report       [facility]    Test Date:  2024  Pt Name:    KEY SHERIDAN              Department: 161  MRN:        [id number]                      Room:  Gender:     Male                          Technician: ZEN  :        1960                   Requested By:LOYD HCOUDHARY  Order #:    [id number]                    Reading MD:    Measurements  Intervals                                Axis  Rate:       69                           P:          51  UT:         162                          QRS:        48  QRSD:       100                          T:          55  QT:         423  QTc:        454    Interpretive Statements  Sinus rhythm  Compared to ECG 2024 23:34:04  Ventricular premature complex(es) no longer present     EKG    Collection Time: 24  1:26 AM   Result Value Ref Range    Report       [facility]    Test Date:  2024  Pt Name:    KEY SHERIDAN              Department: 161  MRN:        [id number]                      Room:  Gender:     Male                         Technician: ZEN  :        1960                   Requested By:LOYD CHOUDHARY  Order #:    [id number]                    Reading MD:    Measurements  Intervals                                Axis  Rate:       67                           P:          50  UT:         163                          QRS:        49  QRSD:       101                          T:          49  QT:         422  QTc:        446    Interpretive Statements  Sinus rhythm  Consider left atrial enlargement  Compared to ECG 2024 23:34:04  Ventricular premature complex(es) no longer present     Basic Metabolic Panel (BMP)    Collection Time: 24  1:31 AM   Result Value Ref Range    Sodium 139 135 - 145 mmol/L    Potassium 4.5 3.6 - 5.5 mmol/L    Chloride 106 96 - 112 mmol/L    Co2 22 20 - 33 mmol/L    Glucose 119 (H) 65 - 99 mg/dL    Bun 30 (H) 8 - 22 mg/dL    Creatinine 0.93 0.50 - 1.40 mg/dL    Calcium 8.7 8.5 - 10.5 mg/dL    Anion Gap 11.0 7.0 - 16.0   Lipid Profile    Collection Time: 24  1:31 AM   Result Value Ref Range    Cholesterol,Tot 161 100 - 199 mg/dL    Triglycerides 131 0 - 149 mg/dL    HDL 47  >=40 mg/dL    LDL 88 <100 mg/dL   TROPONIN    Collection Time: 11/04/24  1:31 AM   Result Value Ref Range    Troponin T 234 (H) 6 - 19 ng/L   ESTIMATED GFR    Collection Time: 11/04/24  1:31 AM   Result Value Ref Range    GFR (CKD-EPI) 91 >60 mL/min/1.73 m 2   TROPONIN    Collection Time: 11/04/24  5:52 AM   Result Value Ref Range    Troponin T 259 (H) 6 - 19 ng/L   TROPONIN    Collection Time: 11/04/24 10:18 AM   Result Value Ref Range    Troponin T 475 (H) 6 - 19 ng/L   Heparin Anti-Xa    Collection Time: 11/04/24 11:24 AM   Result Value Ref Range    Heparin Xa (UFH) >1.10 (HH) IU/mL   POCT activated clotting time device results    Collection Time: 11/04/24  2:08 PM   Result Value Ref Range    Istat Activated Clotting Time 183 (H) 74 - 137 sec   POCT activated clotting time device results    Collection Time: 11/04/24  2:27 PM   Result Value Ref Range    Istat Activated Clotting Time 232 (H) 74 - 137 sec       Imaging:  EC-ECHOCARDIOGRAM COMPLETE W/ CONT         CL-LEFT HEART CATHETERIZATION WITH POSSIBLE INTERVENTION    (Results Pending)         EKG tracings personally reviewed by me  Sinus rhythm    Echocardiogram images personally reviewed by me show left ventricular ejection fraction 45%, apical wall hypokinesis.    All pertinent features of laboratory and imaging reviewed including primary images where applicable      Principal Problem:    NSTEMI (non-ST elevated myocardial infarction) (HCC) (POA: Yes)  Active Problems:    HTN (hypertension) (POA: Yes)    HLD (hyperlipidemia) (POA: Yes)    BMI 32.0-32.9,adult (POA: Yes)    Prediabetes (POA: Yes)    NSTEMI, initial episode of care (HCC) (POA: Yes)  Resolved Problems:    * No resolved hospital problems. *      Assessment / Plan:  Today's encounter addressed an illness with threat to life/bodily function: Non-ST elevation MI.  All the pertinent data - EKGs, labs reviewed, Contrast allergy reviewed.  Airway assessment was performed, patient is suitable for  moderate sedation.  Risk benefits of moderate sedation discussed.  Treatment options including conservative management, coronary angiogram with possible PCI discussed.   Indications for PCI, procedure in detail, recovery, post-operative restrictions, follow up discussed.  Risk benefits of the PCI including but not limited to death, vascular injury, bleeding, cardiac tamponade, kidney injury discussed in detail.    Possible outcomes of the procedure - no intervention, PCI, need for bypass surgery discussed.  If PCI is needed, need for uninterrupted dual antiplatelet therapy, medication compliance discussed.  Patient verbalized understanding would like to proceed.          I personally discussed his case with  Dr Juanjose Cornejo M.D.    No future appointments.    It is my pleasure to participate in the care of Mr. Zhao.  Please do not hesitate to contact me with questions or concerns.    Fabián Whittington M.D.    11/4/2024

## 2024-11-04 NOTE — ASSESSMENT & PLAN NOTE
-Given acuity of his presentation and was unable to discuss lifestyle changes modifications with him on admission but this should be addressed prior to discharge.

## 2024-11-04 NOTE — PROGRESS NOTES
Cardiology Follow Up Progress Note    Date of Service  11/4/2024    Attending Physician  Regis Elizalde M.D.    HPI  Mode Zhao is a 64 y.o. male admitted 11/4/2024 with prior history of hypertension and dyslipidemia followed by Lulu Verma transferred from [facility] for management of NSTEMI.  Follows an active lifestyle including hiking.  No prior heart disease.    Interim Events  11/4: /70.  HR 65.  2/10 chest pain dramatically improved compared to initial chest pain symptoms.    Review of Systems  Review of Systems   Respiratory:  Negative for chest tightness and shortness of breath.    Cardiovascular:  Negative for palpitations.   Gastrointestinal:  Negative for abdominal pain and nausea.   Neurological:  Negative for dizziness and headaches.       Vital signs in last 24 hours  Temp:  [36.6 °C (97.8 °F)-36.7 °C (98 °F)] 36.7 °C (98 °F)  Pulse:  [] 65  Resp:  [14-38] 38  BP: ()/() 110/70  SpO2:  [91 %-99 %] 96 %    Physical Exam  Physical Exam  Constitutional:       General: He is not in acute distress.  Cardiovascular:      Rate and Rhythm: Normal rate and regular rhythm.      Heart sounds: Normal heart sounds, S1 normal and S2 normal. No murmur heard.     No friction rub. No gallop.   Pulmonary:      Effort: Pulmonary effort is normal.      Breath sounds: Normal breath sounds. No wheezing, rhonchi or rales.   Musculoskeletal:      Right lower leg: No edema.      Left lower leg: No edema.   Skin:     General: Skin is warm and dry.   Neurological:      Mental Status: He is alert and oriented to person, place, and time.   Psychiatric:         Behavior: Behavior normal.         Lab Review  Lab Results   Component Value Date/Time    WBC 9.6 11/03/2024 05:33 PM    WBC 4.7 (L) 09/19/2015 09:39 AM    RBC 4.93 11/03/2024 05:33 PM    RBC 4.94 09/19/2015 09:39 AM    HEMOGLOBIN 16.1 11/03/2024 05:33 PM    HEMOGLOBIN 15.7 09/19/2015 09:39 AM    HEMATOCRIT 45.4 11/03/2024 05:33 PM  "   HEMATOCRIT 47.0 09/19/2015 09:39 AM    MCV 92.1 11/03/2024 05:33 PM    MCV 95.1 09/19/2015 09:39 AM    MCH 32.7 11/03/2024 05:33 PM    MCH 31.8 09/19/2015 09:39 AM    MCHC 35.5 11/03/2024 05:33 PM    MCHC 33.4 (L) 09/19/2015 09:39 AM    MPV 9.0 11/03/2024 05:33 PM    MPV 10.1 09/19/2015 09:39 AM      Lab Results   Component Value Date/Time    SODIUM 139 11/04/2024 01:31 AM    POTASSIUM 4.5 11/04/2024 01:31 AM    CHLORIDE 106 11/04/2024 01:31 AM    CO2 22 11/04/2024 01:31 AM    GLUCOSE 119 (H) 11/04/2024 01:31 AM    BUN 30 (H) 11/04/2024 01:31 AM    CREATININE 0.93 11/04/2024 01:31 AM      Lab Results   Component Value Date/Time    ASTSGOT 17 09/14/2017 06:15 AM    ALTSGPT 24 09/14/2017 06:15 AM     Lab Results   Component Value Date/Time    CHOLSTRLTOT 161 11/04/2024 01:31 AM    LDL 88 11/04/2024 01:31 AM    HDL 47 11/04/2024 01:31 AM    TRIGLYCERIDE 131 11/04/2024 01:31 AM    TROPONINT 259 (H) 11/04/2024 05:52 AM       No results for input(s): \"NTPROBNP\" in the last 72 hours.    Cardiac Imaging and Procedures Review  EKG:  My personal interpretation of the EKG dated 11/4/2024 is sinus rhythm with no ischemic symptoms    Echocardiogram: Pending    Cardiac Catheterization: Pending    Imaging  Chest X-Ray: 11/20/2024 Philipp  1. Mild elevation of the left hemidiaphragm without overlying acute airspace disease, pleural effusion or   pneumothorax.     Assessment  NSTEMI  Hypertension  Dyslipidemia    Recommendation Discussion  Clinically hemodynamically stable  Troponin levels plateauing, no ischemic ECG changes  Echocardiogram pending  Continuing aspirin, atorvastatin, metoprolol  Continue IV heparin  Discussed and reviewed in detail with the patient his current cardiac condition and plan of care reviewing the coronary angiogram/cardiac catheterization procedure reviewing the procedure itself, the indications for the procedure and the potential therapeutic scenarios in addition to the potential risks which " include but are not limited to death, myocardial infarction, stroke, contrast related complications, bleeding or infection of which he expressed understanding wish to proceed    Thank you for allowing me to participate in the care of this patient.    Reviewed and discussed with Regis Elizalde M.D.     Please contact me with any questions.    Valentin Murray M.D.   Cardiologist, [facility]  [phone]

## 2024-11-04 NOTE — PROGRESS NOTES
2325: Arrived on unit to transfer pt to ICU for nitro drip. Repeat EKG was being done. Dr. Hudson was on the phone with MDs from [facility]. Pt c/o same 3/10 chest pain. Repeat EKG shown to Dr. Hudson by tele charge RN.    2345: Transferred pt to ICU and started nitro drip. See MAR.    0000: Baseline coags drawn and sent to lab. KYLIE at bedside to transfer pt to [facility].    0025: Called report to receiving RN at [facility]. Notified RN that the heparin drip was not started prior to transfer per previous telephone orders from Dr. Hudson to not start until BP <120. Metoprolol was also not given yet due to inability to clarify strict NPO orders prior to transport arrival and concern that it might drop BP during transport with nitro drip having just been initiated.      0035: Attempted to call pt's wife to let her know pt was transferred. Left voicemail and call back number.

## 2024-11-04 NOTE — ASSESSMENT & PLAN NOTE
-Patient is n.p.o. right now  -He received 50 mg of oral metoprolol in addition to metoprolol IV.  -Also started on nitroglycerin drip for optimization of his blood pressure.  -At baseline, he takes losartan.

## 2024-11-04 NOTE — PROGRESS NOTES
[facility] CONSULT REQUEST REPORT  Consult requested by: Dr Irvin Ann  Reason for consult: Chest pain  Is consult for transition of care: Yes  Pertinent history/hospital Course: Patient 64-year-old gentleman went into the [facility] emergency room today complaining of substernal chest pressure.  The patient's heart score was measured to be 5 given that he has dyslipidemia, hypertension, obesity, ongoing chest pain and is 64-year-old male.  That facility has no cardiac capabilities patient be transferred to [facility] for a chest pain workup.  Patient will undergo serial cardiac markers followed by stress test in the morning.  Patient will not be allowed to have any caffeine and he will be n.p.o. at midnight.  Code Status: Full  Can the hospitalist sign off upon completion of required consult/outcomes requested: No   Assigned hospitalist: Call hospitalist upon completion of MAR, vitals, release of signed and held orders    For any question or concerns regarding the care of this patient, please reach out to the assigned hospitalist.

## 2024-11-04 NOTE — ASSESSMENT & PLAN NOTE
-Patient is taking atorvastatin 20 mg, he might need higher dose.  I appreciate cardiology recommendation for this.

## 2024-11-04 NOTE — H&P
"Hospital Medicine History & Physical Note    Date of Service  11/3/2024    Primary Care Physician  Ev Verma M.D.    Consultants  None    Code Status  Full Code    Chief Complaint  Direct Admission coming from [place] for Atypical Chest Pain workup    History of Presenting Illness  Kendrick Zhao is a 64 y.o. male, with h/o HTN and HLD, coming as a Direct Admission coming from [place] for Atypical Chest Pain workup on 11/3/2024.  Initial workup was normal with negative troponins and no ST elevation on EKG.      He had 2 episodes of crushing\" Chest pain today, associated with nausea and diaphoresis. No prior h/o MI. He is active and exercises regularly. Not a smoker. Family Hx negative for CAD. Patient has Nytro paste, placed by EMS in route for ongoing chest pain.    On arrival, patient still has left-sided chest pain, he rates as 4/10 in intensity.  A stat EKG was done which showed changes concerning for ST elevation on anterior leads.  I order a repeat troponin that is now 107.  His initial troponin at outside facility was negative.    Given ongoing chest pain, dynamic EKG changes and increasing in troponin, I paged cardiology and discussed this case with Dr. Sahhid.  He is recommending optimization of medical therapy and blood pressure control with beta-blocker, nitroglycerin drip and starting him on heparin infusion while arranging transfer to higher level of care at [facility].      I discussed the plan of care with patient.    Review of Systems  Review of Systems   Constitutional:  Positive for diaphoresis (Earlier today, now resolved) and malaise/fatigue. Negative for fever.   HENT:  Negative for congestion and sore throat.    Eyes:  Negative for blurred vision and double vision.   Respiratory:  Positive for shortness of breath. Negative for cough.    Cardiovascular:  Negative for chest pain and palpitations.   Gastrointestinal:  Positive for nausea. Negative for vomiting. "   Genitourinary:  Negative for dysuria and urgency.   Musculoskeletal:  Negative for myalgias and neck pain.   Skin:  Negative for itching and rash.   Neurological:  Negative for dizziness, weakness and headaches.   Endo/Heme/Allergies:  Does not bruise/bleed easily.   Psychiatric/Behavioral:  Negative for depression. The patient does not have insomnia.        Past Medical History  HTN, HLD    Surgical History  Denies recent surgeries    Family History  No Family history of CAD  Family history reviewed with patient. There is no family history that is pertinent to the chief complaint.     Social History   reports that he has never smoked. He has never used smokeless tobacco.    Allergies  Allergies   Allergen Reactions    Lisinopril Cough    Phenobarbital        Medications  None       Physical Exam  Temp:  [36.6 °C (97.9 °F)] 36.6 °C (97.9 °F)  Pulse:  [67] 67  Resp:  [18] 18  BP: (172)/(99) 172/99  SpO2:  [93 %] 93 %  Blood Pressure: (!) 172/99   Temperature: 36.6 °C (97.9 °F)   Pulse: 67   Respiration: 18   Pulse Oximetry: 93 %       Physical Exam  Constitutional:       Comments: Looks uncomfortable    HENT:      Head: Normocephalic and atraumatic.      Nose: Nose normal.      Mouth/Throat:      Mouth: Mucous membranes are moist.      Pharynx: Oropharynx is clear.   Eyes:      Extraocular Movements: Extraocular movements intact.      Pupils: Pupils are equal, round, and reactive to light.   Cardiovascular:      Rate and Rhythm: Normal rate and regular rhythm.      Pulses: Normal pulses.      Heart sounds: Normal heart sounds.   Pulmonary:      Effort: Pulmonary effort is normal.      Breath sounds: Normal breath sounds.   Abdominal:      General: Abdomen is flat. Bowel sounds are normal.      Palpations: Abdomen is soft.   Musculoskeletal:      Cervical back: Normal range of motion and neck supple.   Skin:     General: Skin is warm and dry.   Neurological:      General: No focal deficit present.      Mental  Status: He is alert and oriented to person, place, and time.   Psychiatric:         Mood and Affect: Mood normal.         Behavior: Behavior normal.         Laboratory:  Recent Labs     11/03/24  1733   WBC 9.6   RBC 4.93   HEMOGLOBIN 16.1   HEMATOCRIT 45.4   MCV 92.1   MCH 32.7   MCHC 35.5   RDW 12.7   PLATELETCT 244   MPV 9.0           Outside Facility Laboratory:  CBC: as above  CMP: Na 138, K 3.5, Chloride 107, CO2 21, Glucose 110, Creatinine 1.23, BUN 30, Calcium 9.1, Alk Phos 69, T Bili 0.6, AST 22, ALT 66      Imaging:  Outside Facility Imaging:  IMPRESSION:  1.  Mild elevation of the left hemidiaphragm without overlying acute airspace disease, pleural effusion or pneumothorax.        Outside Facility EKG (My personal review and interpretation)  Normal sinus rhythm at 65 bpm, minimal ST elevation in leads V2 1 to V3.      I provided extensive external medical records review      EKG:  I have personally reviewed the images and compared with prior images. and My impression is: Stat EKG done on arrival is showing sinus rhythm of 70 bpm with ST elevation to the anterior leads concerning for STEMI.  I discussed this findings with cardiologist, Dr. Shahid who reviewed EKG    Assessment/Plan:  Justification for Admission Status  I anticipate this patient will require at least two midnights for appropriate medical management, necessitating inpatient admission because 63 yo M coming as a Direct Admission from [facility].       * NSTEMI (non-ST elevated myocardial infarction) (HCC)  Assessment & Plan  -Initially admitted on telemetry for chest pain rule out workup with negative troponin and no acute ischemia on EKG at outside facility.  -Patient received full dose aspirin and as well nitroglycerin, morphine, Zofran and 1 L bolus NS at outside facility hospital prior to transfer.  -Upon arrival to our hospital, he continues to have chest pain in spite of receiving multiple doses of nitroglycerin and  morphine.  EKG is showing ST elevation on anterior leads, concerning for ACS.  He is initial troponin here is 107 therefore increasing from normal at outside facility.  -I called cardiology and discussed this case with Dr. Shahid he is recommending.  Optimal medical management for non-STEMI and transfer patient to higher level of care for cardiac cath.  -I am giving patient IV metoprolol and as well 50 mg of p.o. metoprolol, starting him on nitroglycerin drip with goal of patient being chest pain-free or systolic blood pressure less than 120.  I am also starting the patient on heparin drip now.  -I contacted to the transfer center, also discussed this case with intensivist, Dr. Barrera.  Patient is excepted to the ICU at [facility].  I appreciate intensivist and cardiology consult and recommendations on this case.    JAXSON (acute kidney injury) (HCC)  Assessment & Plan  -Initial creatinine at outside facility was 1.23.  He received 1 L bolus normal saline and his creatinine is improving, repeat a BMP at our hospital showed creatinine of 1.03.  Closely monitor chemistry panel in the morning.    HLD (hyperlipidemia)  Assessment & Plan  -Patient is taking atorvastatin 20 mg, he might need higher dose.  I appreciate cardiology recommendation for this.    HTN (hypertension)  Assessment & Plan  -Patient is n.p.o. right now  -He received 50 mg of oral metoprolol in addition to metoprolol IV.  -Also started on nitroglycerin drip for optimization of his blood pressure.  -At baseline, he takes losartan.    BMI 32.0-32.9,adult  Assessment & Plan  -Given acuity of his presentation and was unable to discuss lifestyle changes modifications with him on admission but this should be addressed prior to discharge.        -The patient remains critically ill.  Critical care time =105  minutes in directly providing and coordinating critical care and extensive data review.  This includes time spent before the visit reviewing the chart, time spent  evaluating the patient face-to-face  in the room, time discussing and updating Nursing Staff about plan and time spent after the visit reviewing results and on documentation. No time overlap and excludes procedures.    VTE prophylaxis: , But has onIncreasinggoing chest pain, dynamic EKG changes andSCDs/TEDs and therapeutic anticoagulation with Heparin Drip per ACS protocol

## 2024-11-04 NOTE — PROGRESS NOTES
Monitor summary:    Sinus araceli to sinus rhythm with rates from the 50s to 80s    9 beats of wide complex afib/tachycardia    .15/.08/.42

## 2024-11-04 NOTE — PROCEDURES
Cardiac Catheterization and Percutaneous Intervention Procedure Report    11/4/2024    Referring MD:     Primary Care Provider: Ev Verma M.D.    Indication for procedure: NSTEMI    Procedures:  Coronary arteriograms  Left heart catheterization  Angioplasty and placement of a 4.0 by 32 mm, 3.5 by 20 mm Synergy Megatraon drug-eluting stents in mid  left anterior descending coronary artery.    Final diagnosis:   single vessel coronary artery disease.  Successful percutaneous intervention of left anterior descending coronary artery.    Recommendations: Guideline directed medical therapy and risk factor management      Coronary arteriograms:  Left main: normal  Left anterior descending: Occluded in mid portion.  Apical vessel fills through collaterals.  Left circumflex: Calcified vessel with diffuse mild disease, no significant stenosis.  Right coronary: Small nondominant vessel.    Left Heart Catheterization:  Left Ventriculogram: Not performed  Left Ventricular EDP: 23 mm Hg   Aortic Valve Gradient: No significant AV gradient noted    Procedure details:  Kendrick Zhao was brought to the cardiac catheterization lab where the right wrist was prepped and draped in the usual manner for cardiac catheterization.  Timeout was performed.  The area was anesthetized with lidocaine and a 5/6 FR sheath was inserted into the right radial artery without difficulty.  I could not advance sheath through radial access, femoral artery access was obtained using 6 Dominican sheath. A #3.5 left Ana Maria catheter was advanced to the ostia of the Left coronary artery and arteriograms were recorded.   A #4 right Ana Maria catheter was advanced to the ostia of the right coronary artery and arteriograms were recorded. Aortic valve was crossed using #4 right Ana Maria catheter left heart catheterization was performed.  Patient underwent percutaneous coronary revascularization as outlined below.  At the completion of the case the  "sheath was removed and hemostasis achieved utilizing a radial compression band .  Femoral access was closed using Angio-Seal.  Patient was pain-free and hemodynamically stable at the completion of the case.  There were no apparent complications.    Interventional Procedure LAD:     Given the patient's clinical presentation and coronary anatomy, PCI was indicated and we proceeded with the intervention as detailed below.    Indication for PCI:  NSTE- ACD    Pre: 100 %, 50 mm length, LINH 0 flow  Post: 0%, LINH 3 flow    Lesion complexity  High  Severe calcification yes  Bifurcation  No    Guide catheter: EBU 3.75 was advanced to the ostia of the left coronary artery.    Guide wire: A 0.014\" mm  Runthrough was advanced into the artery and crossed the lesion.    Balloon pre-dilatation: 2.0 by 20 mm Emerge inflated to 8 SAMI to pre-dilate the lesion.    Severe calcified vessel.  This was serially dilated using 3.5 x 20 NC balloon.  Distal vessel is smaller than the proximal vessel that is why I decided to place 2 stents.  A 4.5 x 32 mm Synergy Megatron drug-eluting stent was placed in mid LAD.  A 3.5 x 20 mm Synergy Megatron drug-eluting stent was placed in distal LAD in overlapping fashion.  IVUS performed showed good expansion of the stent except in very proximal portion.  This was postdilated using 4.5 x 12 mm NC balloon with good result.    Anticoagulant: Heparin  Antiplatelet: Prasugrel  EBL <25 cc  Complications: none  Specimens: none  Contrast: 64 cc  Fluorotime : see cath lab flowsheet      Sedation: I supervised moderate sedation over a trained independent observer.    Sedation start time: 13:44  End time: 14:40        Electronically signed by   Fabián Whittington M.D., DADA  Interventional cardiologist  11/4/2024  3:03 PM          "

## 2024-11-04 NOTE — ASSESSMENT & PLAN NOTE
-Initial creatinine at outside facility was 1.23.  He received 1 L bolus normal saline and his creatinine is improving, repeat a BMP at our hospital showed creatinine of 1.03.  Closely monitor chemistry panel in the morning.

## 2024-11-04 NOTE — ED NOTES
Med Rec complete per patient, spouse and pharmacy   Allergies reviewed  Antibiotics in the past 30 days:no  Anticoagulant in past 14 days:no  Pharmacy patient utilizes:[facility] in [place], [place]    Verified with pharmacy medication strengths

## 2024-11-04 NOTE — DISCHARGE SUMMARY
Discharge Summary    CHIEF COMPLAINT ON ADMISSION  No chief complaint on file.      Reason for Admission  Chest pain    Admission Date  11/3/2024     CODE STATUS  Full Code    HPI & HOSPITAL COURSE  This is a 64 y.o. male who came as a Direct Admission for [facility] for chest pain workup.  He reports initial presentation with severe chest pain today and diaphoresis.  He has history of hypertension and hyperlipidemia but no prior history of CAD.  His initial troponin was normal and EKG without acute ST elevation at outside facility hospital.      Upon arrival to our hospital, patient still had ongoing chest pain in spite of receiving full dose aspirin, multiple doses of nitroglycerin and morphine.  I repeated EKG which showed dynamic EKG changes concerning for acute coronary ischemia.  Repeat troponin is 107.  I called cardiology, Dr. Shahid.  Patient is being a started on heparin drip, nitroglycerin drip, received both oral and IV metoprolol and arrangements is done for an urgent transfer for higher level of care (ICU to ICU) as he will need cardiac Cath.         Therefore, he is discharged in guarded and stable condition to Higher Level of Care            DISCHARGE DIAGNOSES  Principal Problem:    NSTEMI (non-ST elevated myocardial infarction) (HCC) (POA: Unknown)  Active Problems:    HTN (hypertension) (POA: Unknown)    HLD (hyperlipidemia) (POA: Unknown)    JAXSON (acute kidney injury) (HCC) (POA: Unknown)    BMI 32.0-32.9,adult (POA: Unknown)  Resolved Problems:    Chest pain (POA: Yes)      FOLLOW UP  No future appointments.  No follow-up provider specified.    MEDICATIONS ON DISCHARGE     Medication List        ASK your doctor about these medications        Instructions   aspirin 81 MG Chew chewable tablet  Commonly known as: Asa   Chew 81 mg every day.  Dose: 81 mg     atorvastatin 20 MG Tabs  Commonly known as: Lipitor   Take 20 mg by mouth every day.  Dose: 20 mg     ibuprofen 600 MG  Tabs  Commonly known as: Motrin   Take 600 mg by mouth every 6 hours as needed for Mild Pain.  Dose: 600 mg     losartan 50 MG Tabs  Commonly known as: Cozaar   Take 50 mg by mouth every evening.  Dose: 50 mg     montelukast 10 MG Tabs  Commonly known as: Singulair   Take 10 mg by mouth every day.  Dose: 10 mg              Allergies  Allergies   Allergen Reactions    Lisinopril Cough    Phenobarbital        DIET  Orders Placed This Encounter   Procedures    Diet NPO Restrict to: Strict     Standing Status:   Standing     Number of Occurrences:   1     Order Specific Question:   Diet NPO Restrict to:     Answer:   Strict [1]       ACTIVITY  Bed Rest      LINES, DRAINS, AND WOUNDS  This is an automated list. Peripheral IVs will be removed prior to discharge.  Peripheral IV 11/03/24 18 G Right Antecubital (Active)       Peripheral IV 11/03/24 22 G Posterior;Right Hand (Active)          Peripheral IV 11/03/24 18 G Right Antecubital (Active)       Peripheral IV 11/03/24 22 G Posterior;Right Hand (Active)          CONSULTATIONS  Cardiology:  To    PROCEDURES  None    LABORATORY  Lab Results   Component Value Date    SODIUM 136 11/03/2024    POTASSIUM 4.6 11/03/2024    CHLORIDE 104 11/03/2024    CO2 23 11/03/2024    GLUCOSE 114 (H) 11/03/2024    BUN 28 (H) 11/03/2024    CREATININE 1.03 11/03/2024        Lab Results   Component Value Date    WBC 9.6 11/03/2024    WBC 4.7 (L) 09/19/2015    HEMOGLOBIN 16.1 11/03/2024    HEMOGLOBIN 15.7 09/19/2015    HEMATOCRIT 45.4 11/03/2024    HEMATOCRIT 47.0 09/19/2015    PLATELETCT 244 11/03/2024    PLATELETCT 207 09/19/2015        Total time of the discharge process exceeds 21 minutes.

## 2024-11-04 NOTE — ASSESSMENT & PLAN NOTE
No active chest pain on arrival  Asa 81 s/p 325mg at [facility]  Atorvastatin 80  Nitro drip - titrate to SBP<120  Heparin drip  Metoprolol 50 bid  Echo pending  Trop uptrending  Repeat EKG/trop if chest pain recurs  Cardiology following - plan for cath today (11/4)

## 2024-11-04 NOTE — PROGRESS NOTES
4 Eyes Skin Assessment Completed by Concha RN and Lorenzo RN.    Head WDL  Ears WDL  Nose WDL  Mouth WDL  Neck WDL  Breast/Chest Redness and Blanching from EKG stickers  Shoulder Blades WDL  Spine WDL  (R) Arm/Elbow/Hand Bruising and Scab  (L) Arm/Elbow/Hand Bruising and Scab  Abdomen WDL  Groin WDL  Scrotum/Coccyx/Buttocks WDL  (R) Leg WDL  (L) Leg WDL  (R) Heel/Foot/Toe Scar on heel  (L) Heel/Foot/Toe WDL          Devices In Places ECG, Blood Pressure Cuff, Pulse Ox, and Nasal Cannula      Interventions In Place NC W/Ear Foams, Heel Mepilex, and Pillows    Possible Skin Injury No    Pictures Uploaded Into Epic N/A  Wound Consult Placed N/A  RN Wound Prevention Protocol Ordered No

## 2024-11-04 NOTE — ASSESSMENT & PLAN NOTE
"Elevated troponin with severe anginal pain requiring IV nitroglycerin drip with morphine pushes to control the pain  He underwent cardiac catheterization on November 4, 2024 that showed     \"Severe calcified vessel.  This was serially dilated using 3.5 x 20 NC balloon.  Distal vessel is smaller than the proximal vessel that is why I decided to place 2 stents.  A 4.5 x 32 mm Synergy Megatron drug-eluting stent was placed in mid LAD.  A 3.5 x 20 mm Synergy Megatron drug-eluting stent was placed in distal LAD in overlapping fashion.\"  Aspirin, high intensity statin, beta-blocker  Continues telemetry monitoring to evaluate for arrhythmias  I reviewed echocardiogram that showed ejection fraction of 55%.  Ascending aorta diameter 3.7 cm  "

## 2024-11-04 NOTE — PROGRESS NOTES
[facility] DIRECT ADMIT ACCEPTANCE NOTE    Transferring facility: [facility]  Transferring provider: Dr. Hudson    Chief complaint: chest pain  Pertinent history & patient course: 64M admitted to [facility] this evening with NSTEMI and chest pain on NTG drip.  Pertinent imaging & lab results: EKG with 1-2mm anterior ST elevation.  troponin 107  Consultants called prior to transfer and pertinent input from consultants: Dr. Shahid  Code Status: Full per transferring provider, I personally verified with the transferring provider patient's code status and the transferring provider has confirmed this with the patient.  Reason for Transfer: ACS  Further work up or recommendations requested prior to transfer: repeat EKG    Patient accepted for transfer: Yes  Accepting [facility] Facility: [facility] - Nursing to notify the Triage Coordinator in the RTOC via Voalte or Phone ext. [phone] when patient arrives to the unit. The Triage Coordinator will assign the admitting provider.    Consultants to be called upon arrival: Dr. Shahid  Admission status: Inpatient.   Floor requested: ICU    The admitting provider is the point of contact for questions or concerns regarding the patient's care.

## 2024-11-04 NOTE — H&P
Hospital Medicine History & Physical Note    Date of Service  11/4/2024    Primary Care Physician  Ev Verma M.D.    Consultants  cardiology    Specialist Names: [facility]    Code Status  Full Code    Chief Complaint  chest pain.    History of Presenting Illness  Kendrick Zhao is a 64 y.o. male who presented 11/4/2024 with chest pain.  Mr. Zhao has a past medical history of hypertension on Cozaar and dyslipidemia on aspirin and lipitor that presented to the ER at [place] with chest pain. He was transferred to [facility] and admitted by Dr. Jesus. He developed severe chest pain with an increase in his troponin to 107 for which he was initiated on an IV heparin drip and IV nitroglycerin drip and transferred to [facility] for ICU admission and cardiology consultation. He is still requiring PRN IV morphine for ongoing chest pressure. He notes + nausea. His wife is at bedside. He is scheduled for left heart catheterization today.    I discussed the plan of care with Dr. Elizalde in person.    Review of Systems  Review of Systems   Constitutional:  Negative for chills and fever.   Respiratory:  Negative for shortness of breath.    Cardiovascular:  Positive for chest pain. Negative for leg swelling.   Gastrointestinal:  Positive for nausea.       Past Medical History  HTN, dyslipidemia    Surgical History   Cataracts  Oral surgery    Family History  No coronary disease     Social History   reports that he has never smoked. He has never used smokeless tobacco.    Allergies  Allergies   Allergen Reactions    Lisinopril Cough    Phenobarbital        Medications  Prior to Admission Medications   Prescriptions Last Dose Informant Patient Reported? Taking?   albuterol 108 (90 Base) MCG/ACT Aero Soln inhalation aerosol 11/2/2024 Significant Other Yes Yes   Sig: Inhale 1-2 Puffs every 6 hours as needed for Shortness of Breath.   aspirin 81 MG EC tablet 11/3/2024 Morning Patient Yes  Yes   Sig: Take 81 mg by mouth every day.   atorvastatin (LIPITOR) 20 MG Tab 11/3/2024 Morning Significant Other, Patient's Home Pharmacy Yes Yes   Sig: Take 20 mg by mouth every day.   ibuprofen (MOTRIN) 200 MG Tab 11/3/2024 Patient Yes Yes   Sig: Take 600 mg by mouth 1 time a day as needed for Mild Pain. 600 mg = 3 tabs   losartan (COZAAR) 50 MG Tab 11/2/2024 Evening Significant Other, Patient's Home Pharmacy Yes No   Sig: Take 50 mg by mouth every evening.   montelukast (SINGULAIR) 10 MG Tab 11/3/2024 Morning Significant Other, Patient's Home Pharmacy Yes Yes   Sig: Take 10 mg by mouth every day.      Facility-Administered Medications: None       Physical Exam  Temp:  [36.3 °C (97.3 °F)-36.7 °C (98 °F)] 36.7 °C (98 °F)  Pulse:  [] 65  Resp:  [14-38] 38  BP: ()/() 110/70  SpO2:  [91 %-99 %] 96 %  Blood Pressure: 110/70   Temperature: 36.7 °C (98 °F)   Pulse: 65   Respiration: (!) 38   Pulse Oximetry: 96 %       Physical Exam  Vitals and nursing note reviewed.   Constitutional:       General: He is not in acute distress.     Appearance: He is not toxic-appearing.   Neck:      Vascular: No carotid bruit.   Cardiovascular:      Rate and Rhythm: Normal rate and regular rhythm.      Heart sounds: No murmur heard.  Pulmonary:      Effort: Pulmonary effort is normal.      Breath sounds: Normal breath sounds.   Abdominal:      General: There is no distension.      Tenderness: There is no abdominal tenderness.   Musculoskeletal:      Right lower leg: No edema.      Left lower leg: No edema.   Skin:     General: Skin is dry.   Neurological:      General: No focal deficit present.      Mental Status: He is alert and oriented to person, place, and time.   Psychiatric:         Mood and Affect: Mood normal.         Behavior: Behavior normal.         Laboratory:  Recent Labs     11/03/24  1733   WBC 9.6   RBC 4.93   HEMOGLOBIN 16.1   HEMATOCRIT 45.4   MCV 92.1   MCH 32.7   MCHC 35.5   RDW 12.7   PLATELETCT 244  "  MPV 9.0     Recent Labs     11/03/24 2139 11/04/24  0131   SODIUM 136 139   POTASSIUM 4.6 4.5   CHLORIDE 104 106   CO2 23 22   GLUCOSE 114* 119*   BUN 28* 30*   CREATININE 1.03 0.93   CALCIUM 9.0 8.7     Recent Labs     11/03/24 2139 11/04/24  0131   GLUCOSE 114* 119*     Recent Labs     11/04/24  0000   APTT 22.7*   INR 0.99     No results for input(s): \"NTPROBNP\" in the last 72 hours.  Recent Labs     11/04/24  0131   TRIGLYCERIDE 131   HDL 47   LDL 88     Recent Labs     11/04/24 0131 11/04/24  0552 11/04/24  1018   TROPONINT 234* 259* 475*       Imaging:  EC-ECHOCARDIOGRAM COMPLETE W/ CONT         CL-LEFT HEART CATHETERIZATION WITH POSSIBLE INTERVENTION    (Results Pending)           Assessment/Plan:  Justification for Admission Status  I anticipate this patient will require at least two midnights for appropriate medical management, necessitating inpatient admission because treatment of a STEMI going to the cath lab    Patient will need a Intermediate Care (Adult and Pediatrics) bed on MEDICAL service .  The need is secondary to as above.    * NSTEMI (non-ST elevated myocardial infarction) (HCC)- (present on admission)  Assessment & Plan  Elevated troponin with severe anginal pain requiring IV nitroglycerin drip with morphine pushes to control the pain  IV heparin drip  He is scheduled to go to the Cath Lab today for possible stenting  Aspirin, high intensity statin, beta-blocker  Continues telemetry monitoring to evaluate for arrhythmias  Echocardiogram ordered and pending    HLD (hyperlipidemia)- (present on admission)  Assessment & Plan  He was on Lipitor 20 mg as an outpatient and LDL is 88  Increase Lipitor to 80 mg daily for now    HTN (hypertension)- (present on admission)  Assessment & Plan  He was on Cozaar 50 mg daily as an outpatient  For now his blood pressure is being managed with IV nitroglycerin drip    BMI 32.0-32.9,adult- (present on admission)  Assessment & Plan  BMI 32.9        VTE " prophylaxis: therapeutic anticoagulation with IV heparin drip

## 2024-11-04 NOTE — ASSESSMENT & PLAN NOTE
-Initially admitted on telemetry for chest pain rule out workup with negative troponin and no acute ischemia on EKG at outside facility.  -Patient received full dose aspirin and as well nitroglycerin, morphine, Zofran and 1 L bolus NS at outside facility hospital prior to transfer.  -Upon arrival to our hospital, he continues to have chest pain in spite of receiving multiple doses of nitroglycerin and morphine.  EKG is showing ST elevation on anterior leads, concerning for ACS.  He is initial troponin here is 107 therefore increasing from normal at outside facility.  -I called cardiology and discussed this case with Dr. Shahid he is recommending.  Optimal medical management for non-STEMI and transfer patient to higher level of care for cardiac cath.  -I am giving patient IV metoprolol and as well 50 mg of p.o. metoprolol, starting him on nitroglycerin drip with goal of patient being chest pain-free or systolic blood pressure less than 120.  I am also starting the patient on heparin drip now.  -I contacted to the transfer center, also discussed this case with intensivist, Dr. Barrera.  Patient is excepted to the ICU at [facility].  I appreciate intensivist and cardiology consult and recommendations on this case.

## 2024-11-04 NOTE — HOSPITAL COURSE
"\"64M with history of HTN, HLD who developed chest pain and diaphoresis while sitting in a chair at home.  He presented to [place] where he had two negative troponins. Chest pain was relieved by NTG. He was transferred to [facility] for further management.  While at [facility] he had ongoing chest pain despite NTG.  EKG revealed anterior ST elevation <2mm with lateral ST depressions.  Troponin returned positive at 101.  He was moved to their ICU for active NTG titration and transfer request for cath lab capabilities was requested. On arrival to the ICU at [facility], he is not having any chest pain, diaphoresis or dyspnea. EKG does not show any ST/T wave changes. Repeat troponin uptrending to 234. Bedside echo with normal BiV function, no significant pericardial effusion.  Trace AI.  Mild apical/anteroseptal hypokinesis.\"  Above taken from Dr Barrera's admission HPI   "

## 2024-11-04 NOTE — PROGRESS NOTES
Called and got report on patient. Per [facility], they are awaiting his second troponin to result before they set up transport to [facility].

## 2024-11-04 NOTE — PROGRESS NOTES
"Critical Care Daily Progress Note    Date of Service  11/4/2024    Chief Complaint  Kendrick Zhao is a 64 y.o. male admitted 11/4/2024 with chest pain, NSTEMI    Hospital Course  \"64M with history of HTN, HLD who developed chest pain and diaphoresis while sitting in a chair at home.  He presented to [place] where he had two negative troponins. Chest pain was relieved by NTG. He was transferred to [facility] for further management.  While at [facility] he had ongoing chest pain despite NTG.  EKG revealed anterior ST elevation <2mm with lateral ST depressions.  Troponin returned positive at 101.  He was moved to their ICU for active NTG titration and transfer request for cath lab capabilities was requested. On arrival to the ICU at [facility], he is not having any chest pain, diaphoresis or dyspnea. EKG does not show any ST/T wave changes. Repeat troponin uptrending to 234. Bedside echo with normal BiV function, no significant pericardial effusion.  Trace AI.  Mild apical/anteroseptal hypokinesis.\"  Above taken from Dr Barrera's admission HPI     Interval Problem Update  Reviewed last 24 hour events:              - Acute Overnight events:Chest pain nearly resolved w/ nitro   - Tm: 36.7   - Hr: 82   - SBP: 156   - I/Os: -.16L    - Vasoactive Drips: Nitro gtt   - Misc Drips: Heparin gtt   - RASS: 0 Sedation: None Analgesia: Tylenol, morphine PRN   - Vent settings and day: 2L NC SAT/SBT: N/A              - Lines/Tubes: pIVs              - DVT Ppx: Heparin gtt   - Nutrition: NPO, Bowel Reg: PRN, GI PPX: N/A   - Glucose control: In range   - Abx: None   - Mobility: 4   - Plan for Today: transfer to [facility] for ongoing care, to go for [facility] this AM.        I have discussed this patient's plan of care and discharge plan at IDT rounds today with Case Management, Nursing, Nursing leadership, and other members of the IDT team.    Consultants/Specialty  cardiology    Code Status  Full " Code    Disposition  <MEDICALLYCLEARED>  I have placed the appropriate orders for post-discharge needs.    Review of Systems  Review of Systems   Cardiovascular:  Positive for chest pain.   Neurological:  Positive for headaches.        Physical Exam  Temp:  [36.3 °C (97.3 °F)-36.7 °C (98 °F)] 36.7 °C (98 °F)  Pulse:  [] 60  Resp:  [14-22] 21  BP: ()/() 124/67  SpO2:  [91 %-97 %] 94 %    Physical Exam  Constitutional:       Appearance: Normal appearance. He is obese.   HENT:      Head: Normocephalic and atraumatic.   Eyes:      Extraocular Movements: Extraocular movements intact.      Conjunctiva/sclera: Conjunctivae normal.   Cardiovascular:      Rate and Rhythm: Normal rate and regular rhythm.   Pulmonary:      Effort: Pulmonary effort is normal.      Breath sounds: Normal breath sounds.   Abdominal:      General: Abdomen is flat.      Palpations: Abdomen is soft.   Musculoskeletal:      Right lower leg: No edema.      Left lower leg: No edema.   Skin:     General: Skin is warm and dry.   Neurological:      General: No focal deficit present.      Mental Status: He is alert and oriented to person, place, and time.         Fluids    Intake/Output Summary (Last 24 hours) at 11/4/2024 0626  Last data filed at 11/4/2024 0611  Gross per 24 hour   Intake 159.65 ml   Output 325 ml   Net -165.35 ml        Laboratory  Recent Labs     11/03/24  1733   WBC 9.6   RBC 4.93   HEMOGLOBIN 16.1   HEMATOCRIT 45.4   MCV 92.1   MCH 32.7   MCHC 35.5   RDW 12.7   PLATELETCT 244   MPV 9.0     Recent Labs     11/03/24  2139 11/04/24  0131   SODIUM 136 139   POTASSIUM 4.6 4.5   CHLORIDE 104 106   CO2 23 22   GLUCOSE 114* 119*   BUN 28* 30*   CREATININE 1.03 0.93   CALCIUM 9.0 8.7     Recent Labs     11/04/24  0000   APTT 22.7*   INR 0.99         Recent Labs     11/04/24  0131   TRIGLYCERIDE 131   HDL 47   LDL 88       Imaging  EC-ECHOCARDIOGRAM COMPLETE W/O CONT    (Results Pending)   CL-LEFT HEART CATHETERIZATION WITH  POSSIBLE INTERVENTION    (Results Pending)        Assessment/Plan  * NSTEMI (non-ST elevated myocardial infarction) (HCC)- (present on admission)  Assessment & Plan  No active chest pain on arrival  Asa 81 s/p 325mg at [facility]  Atorvastatin 80  Nitro drip - titrate to SBP<120  Heparin drip  Metoprolol 50 bid  Echo pending  Trop uptrending  Repeat EKG/trop if chest pain recurs  Cardiology following - plan for cath today (11/4)     Prediabetes- (present on admission)  Assessment & Plan  A1c 6.1 in 2022  Repeat a1c    HLD (hyperlipidemia)- (present on admission)  Assessment & Plan  High intensity statin    HTN (hypertension)- (present on admission)  Assessment & Plan  Goal SBP <120; titrate nitro to BP goal  Restart home meds when clinically indicated         VTE prophylaxis:    therapeutic anticoagulation with weight-based heparin gtt, pharmacy to adjust PRN      I have performed a physical exam and reviewed and updated ROS and Plan today (11/4/2024). In review of yesterday's note (11/3/2024), there are no changes except as documented above.      The patient remains critically ill. Critical care time = 35 mins in directly providing and coordinating critical care and extensive data review. No time overlap and excludes procedures.

## 2024-11-04 NOTE — CONSULTS
Cardiology Consult Note:    Edwar Shahid M.D.  Date & Time note created:    11/4/2024   2:03 AM     Referring MD:  Dr. Barrera    Patient ID:   Name:             Kendrick Zhao   YOB: 1960  Age:                 64 y.o.  male   MRN:               [id number]                                                             Chief Complaint / Reason for consult:  Chest pain, elevated troponin.    History of Present Illness:    This is a 64 years old man with no prior cardiac problems, presented to the hospital at [facility] with new onset chest pain.  He was then transferred to [facility] for further evaluation.  Upon arrival, patient has been persistently hypertensive with systolic blood pressure in the 170s.  Patient is having chest pressure sensation at this time however his chest pain improved with nitroglycerin and when his blood pressure lowered.    I personally interpreted the EKG tracings which do not show evidence of ST elevation.  There is high risk feature of ST depression laterally suggestive of coronary ischemia.    His first set of troponin is elevated at 107, GFR of 81 and potassium of 4.6.  His previous hemoglobin A1c in 2022 was 6.1.    Review of Systems:      Constitutional: Denies fevers, Denies weight changes  Eyes: Denies changes in vision, no eye pain  Ears/Nose/Throat/Mouth: Denies nasal congestion or sore throat   Cardiovascular: yes chest pain, no palpitations   Respiratory: no shortness of breath , Denies cough  Gastrointestinal/Hepatic: Denies abdominal pain, nausea, vomiting, diarrhea, constipation or GI bleeding   Genitourinary: Denies dysuria or frequency  Musculoskeletal/Rheum: Denies  joint pain and swelling   Skin: Denies rash  Neurological: Denies headache, confusion, memory loss or focal weakness/parasthesias  Psychiatric: denies mood disorder   Endocrine: Siomara thyroid problems  Heme/Oncology/Lymph Nodes: Denies enlarged lymph nodes, denies brusing or  known bleeding disorder  All other systems were reviewed and are negative (AMA/CMS criteria)                Past Medical History:   No past medical history on file.  Active Hospital Problems    Diagnosis     NSTEMI (non-ST elevated myocardial infarction) (HCC) [I21.4]     HTN (hypertension) [I10]     HLD (hyperlipidemia) [E78.5]     Prediabetes [R73.03]        Past Surgical History:  No past surgical history on file.    Hospital Medications:    Current Facility-Administered Medications:     aspirin EC tablet 81 mg, 81 mg, Oral, DAILY, Effie Jesus M.D.    heparin infusion 25,000 units in 500 mL 0.45% NACL, 0-30 Units/kg/hr (Adjusted), Intravenous, Continuous, Effie Jesus M.D., Last Rate: 20.5 mL/hr at 11/04/24 0145, 12 Units/kg/hr at 11/04/24 0145    acetaminophen (Tylenol) tablet 650 mg, 650 mg, Oral, Q6HRS PRN, Effie Jesus M.D.    heparin injection 2,000 Units, 2,000 Units, Intravenous, PRN, Effie Jesus M.D.    morphine 4 MG/ML injection 2 mg, 2 mg, Intravenous, Q3HRS PRN, Effie Jesus M.D.    morphine 4 MG/ML injection 4 mg, 4 mg, Intravenous, Q3HRS PRN, Effie Jesus M.D.    ondansetron (Zofran ODT) dispertab 4 mg, 4 mg, Oral, Q4HRS PRN, Effie Jesus M.D.    ondansetron (Zofran) syringe/vial injection 4 mg, 4 mg, Intravenous, Q4HRS PRN, Effie Jesus M.D., 4 mg at 11/04/24 0159    atorvastatin (Lipitor) tablet 20 mg, 20 mg, Oral, DAILY, Effie Jesus M.D.    nitroglycerin 50 mg in D5W 250 ml infusion, 0-200 mcg/min, Intravenous, Continuous, Effie Jesus M.D., Last Rate: 45 mL/hr at 11/04/24 0148, 150 mcg/min at 11/04/24 0148    Current Outpatient Medications:  Medications Prior to Admission   Medication Sig Dispense Refill Last Dose/Taking    atorvastatin (LIPITOR) 20 MG Tab Take 20 mg by mouth every day.       losartan (COZAAR) 50 MG Tab Take 50 mg by mouth every evening.       montelukast (SINGULAIR)  10 MG Tab Take 10 mg by mouth every day.       aspirin (ASA) 81 MG Chew Tab chewable tablet Chew 81 mg every day.       ibuprofen (MOTRIN) 600 MG Tab Take 600 mg by mouth every 6 hours as needed for Mild Pain.          Medication Allergy:  Allergies   Allergen Reactions    Lisinopril Cough    Phenobarbital        Family History:  No family history on file.    Social History:  Social History     Socioeconomic History    Marital status:      Spouse name: Not on file    Number of children: Not on file    Years of education: Not on file    Highest education level: Not on file   Occupational History    Not on file   Tobacco Use    Smoking status: Never    Smokeless tobacco: Never   Substance and Sexual Activity    Alcohol use: Not on file    Drug use: Not on file    Sexual activity: Not on file   Other Topics Concern    Not on file   Social History Narrative    Not on file     Social Drivers of Health     Financial Resource Strain: Not on File (8/24/2019)    Received from [facility]    Financial Resource Strain     Financial Resource Strain: 0   Food Insecurity: No Food Insecurity (11/3/2024)    Hunger Vital Sign     Worried About Running Out of Food in the Last Year: Never true     Ran Out of Food in the Last Year: Never true   Transportation Needs: No Transportation Needs (11/3/2024)    PRAPARE - Transportation     Lack of Transportation (Medical): No     Lack of Transportation (Non-Medical): No   Physical Activity: Not on File (8/24/2019)    Received from [facility]    Physical Activity     Physical Activity: 0   Stress: Not on File (8/24/2019)    Received from [facility]    Stress     Stress: 0   Social Connections: Not on File (8/24/2019)    Received from [facility]    Social Connections     Social Connections and Isolation: 0   Intimate Partner Violence: Not At Risk (11/3/2024)    Humiliation, Afraid, Rape, and Kick questionnaire     Fear of Current or Ex-Partner: No     Emotionally Abused: No     Physically Abused: No      "Sexually Abused: No   Housing Stability: Low Risk  (11/3/2024)    Housing Stability Vital Sign     Unable to Pay for Housing in the Last Year: No     Number of Times Moved in the Last Year: 1     Homeless in the Last Year: No         Physical Exam:  Vitals/ General Appearance:   Weight/BMI: Body mass index is 32.93 kg/m².  BP (!) 179/101   Pulse 70   Ht 1.778 m (5' 10\")   Wt 104 kg (229 lb 8 oz)   Vitals:    11/04/24 0000 11/04/24 0050 11/04/24 0138   BP:   (!) 179/101   Pulse:   70   Weight:  104 kg (229 lb 8 oz)    Height: 1.778 m (5' 10\") 1.778 m (5' 10\")      Oxygen Therapy:       Constitutional:   No acute distress  HENMT:  Normocephalic, Atraumatic.  Eyes:  EOMI, No discharge.  Neck:  no JVD.  Cardiovascular:  Normal heart rate, Normal rhythm.  Extremitites with intact distal pulses, no cyanosis, or edema.  Lungs:  No respiratory distress.  Abdomen: Soft, No tenderness, No guarding, No rebound.  Skin: No significant rash.  Neurologic: Alert & oriented x 3, No focal deficits noted, cranial nerves II through X are intact.  Psychiatric: Affect normal, Judgment normal, Mood normal.      MDM (Data Review):     Records reviewed and summarized in current documentation    Lab Data Review:  Recent Results (from the past 24 hours)   CBC WITH DIFFERENTIAL    Collection Time: 11/03/24  5:33 PM   Result Value Ref Range    WBC 9.6 4.0 - 10.0 K/uL    RBC 4.93 4.70 - 6.10 M/uL    Hemoglobin 16.1 14.0 - 18.0 g/dL    Hematocrit 45.4 42.0 - 54.0 %    MCV 92.1 81.0 - 100.0 fL    MCH 32.7 26.0 - 34.0 pg    MCHC 35.5 33.0 - 37.0 g/dL    RDW 12.7 11.5 - 14.5 %    Platelet Count 244 130 - 400 K/uL    MPV 9.0 8.5 - 12.5 fL    Neutrophils-Polys 45 42 - 75 %    Lymphocytes 43 21 - 51 %    Monocytes 8 2 - 12 %    Eosinophils 3 0 - 7 %    Basophils 1 0 - 2 %    Immature Granulocytes 0 0 - 1 %    Neutrophils (Absolute) 4.29 1.50 - 8.00 K/uL    Lymphs (Absolute) 4.08 0.70 - 4.50 K/uL    Monos (Absolute) 0.78 0.10 - 1.00 K/uL    Eos " (Absolute) 0.31 0.00 - 0.40 K/uL    Baso (Absolute) 0.10 0.00 - 0.20 K/uL    Immature Granulocytes (abs) 0.02 0.00 - 2.90 K/uL   Basic Metabolic Panel    Collection Time: 24  9:39 PM   Result Value Ref Range    Sodium 136 135 - 145 mmol/L    Potassium 4.6 3.6 - 5.5 mmol/L    Chloride 104 96 - 112 mmol/L    Co2 23 20 - 33 mmol/L    Glucose 114 (H) 65 - 99 mg/dL    Bun 28 (H) 8 - 22 mg/dL    Creatinine 1.03 0.50 - 1.40 mg/dL    Calcium 9.0 8.4 - 10.2 mg/dL    Anion Gap 9.0 7.0 - 16.0   TROPONIN    Collection Time: 24  9:39 PM   Result Value Ref Range    Troponin T 107 (H) 6 - 19 ng/L   ESTIMATED GFR    Collection Time: 24  9:39 PM   Result Value Ref Range    GFR (CKD-EPI) 81 >60 mL/min/1.73 m 2   EKG    Collection Time: 24 10:31 PM   Result Value Ref Range    Report       [facility]    Test Date:  2024  Pt Name:    KEY SHERIDAN              Department: [facility]  MRN:        [id number]                      Room:       3317  Gender:     Male                         Technician: [id number]  :        1960                   Requested By:LOYD CHOUDHARY  Order #:    [id number]                    Reading MD: Edwar Shahid MD    Measurements  Intervals                                Axis  Rate:       70                           P:          41  MI:         161                          QRS:        21  QRSD:       99                           T:          54  QT:         421  QTc:        455    Interpretive Statements  Sinus rhythm  Left atrial enlargement  No previous ECG available for comparison  Electronically Signed On 2024 00:24:03 PST by Edwar Shahid MD     EKG    Collection Time: 24 11:34 PM   Result Value Ref Range    Report       [facility]    Test Date:  2024  Pt Name:    KEY SHERIDAN              Department: [facility]  MRN:        [id number]                      Room:       3317  Gender:     Male                         Technician: [id number]  :         1960                   Requested By:LIVIER VARGAS  Order #:    [id number]                    Reading MD: Edwar Shahid MD    Measurements  Intervals                                Axis  Rate:       65                           P:          46  WY:         164                          QRS:        23  QRSD:       99                           T:          68  QT:         417  QTc:        434    Interpretive Statements  Sinus rhythm  Ventricular premature complex  Compared to ECG 2024 22:31:50  Ventricular premature complex(es) now present  Atrial abnormality no longer present      Electronically Signed On 2024 00:25:12 PST by Edwar Shahid MD     aPTT    Collection Time: 24 12:00 AM   Result Value Ref Range    APTT 22.7 (L) 24.7 - 36.0 sec   Prothrombin Time    Collection Time: 24 12:00 AM   Result Value Ref Range    PT 13.4 12.0 - 14.6 sec    INR 0.99 0.87 - 1.13   Heparin Xa (Unfractionated)    Collection Time: 24 12:00 AM   Result Value Ref Range    Heparin Xa (UFH) <0.10 IU/mL   EKG in four (4) hours    Collection Time: 24  1:25 AM   Result Value Ref Range    Report       [facility]    Test Date:  2024  Pt Name:    KEY SHERIDAN              Department: 161  MRN:        [id number]                      Room:  Gender:     Male                         Technician: ZEN  :        1960                   Requested By:LOYD CHOUDHARY  Order #:    [id number]                    Reading MD:    Measurements  Intervals                                Axis  Rate:       69                           P:          51  W[place]:         162                          QRS:        48  QRSD:       100                          T:          55  QT:         423  QTc:        454    Interpretive Statements  Sinus rhythm  Compared to ECG 2024 23:34:04  Ventricular premature complex(es) no longer present     EKG    Collection Time: 24  1:26 AM   Result Value Ref  Range    Report       [facility]    Test Date:  2024  Pt Name:    KEY SHERIDAN              Department: 161  MRN:        [id number]                      Room:  Gender:     Male                         Technician: ZEN  :        1960                   Requested By:LOYD CHOUDHARY  Order #:    [id number]                    Reading MD:    Measurements  Intervals                                Axis  Rate:       67                           P:          50  AZ:         163                          QRS:        49  QRSD:       101                          T:          49  QT:         422  QTc:        446    Interpretive Statements  Sinus rhythm  Consider left atrial enlargement  Compared to ECG 2024 23:34:04  Ventricular premature complex(es) no longer present         Imaging/Procedures Review:    Chest Xray:  Reviewed    EKG:   As in HPI.     MDM (Assessment and Plan):     Active Hospital Problems    Diagnosis     NSTEMI (non-ST elevated myocardial infarction) (HCC) [I21.4]     HTN (hypertension) [I10]     HLD (hyperlipidemia) [E78.5]     Prediabetes [R73.03]          At this time, patient does meet criteria for acute coronary syndrome with non-ST elevation myocardial infarction type.  With his blood pressure being elevated, we will optimize medical therapies before an early invasive strategy.  I would recommend nitroglycerin drip to titrate to chest pain-free and or systolic blood pressure less than 120.  Metoprolol 50 mg p.o. twice a day.  Okay to initiate heparin drip via protocol for ACS.  Atorvastatin 80 mg p.o. once a day aspirin 325 mg p.o. once a day.    I spent 35 minutes of critical care time during the consultation and treatment of this patient's complex and critically ill medical issues without overlapping with other physician's care.      Thank you for referring this patient to our cardiology service.  We will follow patient with you.      Edwar Shahid MD.   Cardiology  Inpatient Service.  [facility].  [phone].  Cheryl Garcia.

## 2024-11-04 NOTE — CONSULTS
Internal Medicine Consultation    Date of consult: 11/4/2024    Referring Physician  Johan Barrera M.D.    Reason for Consultation  NSTEMI    History of Presenting Illness  64 y.o. male directly admitted from [facility] on 11/3/2024 with NSTEMI. PMH: htn, hld. No known CAD, non-smoker. Patient originally presented to [place] earlier this morning with left sided sharp chest pain, diaphoresis, and radiation down left arm. Initial troponin at [facility] normal. On arrival to [facility], patient was started on heparin drip, nitro drip, and given oral and iv metoprolol due to persistent chest pain, elevated troponin 107 (UL 19), and concern for dynamic ST changes on EKG.    On arrival to [facility], patient reports chest pain improved with nitro. Sitting comfortably at time of interview. Cardiology following.    Code Status  Full Code    Review of Systems  Review of Systems   Constitutional:  Positive for diaphoresis. Negative for chills and fever.   HENT:  Negative for congestion.    Eyes:  Negative for redness.   Respiratory:  Negative for shortness of breath and wheezing.    Cardiovascular:  Positive for chest pain. Negative for palpitations.   Gastrointestinal:  Negative for abdominal pain and vomiting.   Neurological:  Negative for weakness.       Past Medical History   has no past medical history on file.    Surgical History   has no past surgical history on file.    Family History  family history is not on file.    Social History   reports that he has never smoked. He has never used smokeless tobacco.    Medications  Home Medications    **Home medications have not yet been reviewed for this encounter**       Current Facility-Administered Medications   Medication Dose Route Frequency Provider Last Rate Last Admin    heparin infusion 25,000 units in 500 mL 0.45% NACL  0-30 Units/kg/hr (Adjusted) Intravenous Continuous Effie Jesus M.D. 20.5 mL/hr at 11/04/24 0145 12 Units/kg/hr at 11/04/24 0145     acetaminophen (Tylenol) tablet 650 mg  650 mg Oral Q6HRS PRN Effie Jesus M.D.        heparin injection 2,000 Units  2,000 Units Intravenous PRN Effie Jesus M.D.        morphine 4 MG/ML injection 2 mg  2 mg Intravenous Q3HRS PRN Effie Jesus M.D.        morphine 4 MG/ML injection 4 mg  4 mg Intravenous Q3HRS PRN Effie Jesus M.D.        ondansetron (Zofran ODT) dispertab 4 mg  4 mg Oral Q4HRS PRN Effie Jesus M.D.        ondansetron (Zofran) syringe/vial injection 4 mg  4 mg Intravenous Q4HRS PRN Effie Jesus M.D.   4 mg at 11/04/24 0159    nitroglycerin 50 mg in D5W 250 ml infusion  0-200 mcg/min Intravenous Continuous Effie Jesus M.D. 45 mL/hr at 11/04/24 0148 150 mcg/min at 11/04/24 0148    metoprolol tartrate (Lopressor) tablet 50 mg  50 mg Oral BID Edwar Shahid M.D.        atorvastatin (Lipitor) tablet 80 mg  80 mg Oral DAILY Roxy Way M.D.        aspirin EC tablet 324 mg  324 mg Oral DAILY Edwar Shahid M.D.           Allergies  Allergies   Allergen Reactions    Lisinopril Cough    Phenobarbital        Vital Signs last 24 hours  Temp:  [36.3 °C (97.3 °F)-36.6 °C (97.9 °F)] 36.3 °C (97.3 °F)  Pulse:  [66-75] 70  Resp:  [17-20] 20  BP: (130-179)/() 179/101  SpO2:  [92 %-96 %] 96 %    Physical Exam  Physical Exam  Vitals and nursing note reviewed.   Constitutional:       General: He is awake. He is not in acute distress.     Appearance: He is obese. He is not ill-appearing, toxic-appearing or diaphoretic.   HENT:      Head: Normocephalic.   Cardiovascular:      Rate and Rhythm: Normal rate and regular rhythm.      Heart sounds: Normal heart sounds. No murmur heard.  Pulmonary:      Effort: Pulmonary effort is normal. No respiratory distress.      Breath sounds: No wheezing.   Abdominal:      Palpations: Abdomen is soft.   Musculoskeletal:      Right lower leg: No edema.      Left lower leg: No edema.    Skin:     General: Skin is warm and dry.      Coloration: Skin is not jaundiced or pale.   Neurological:      General: No focal deficit present.      Mental Status: He is alert. Mental status is at baseline.   Psychiatric:         Mood and Affect: Mood normal.         Behavior: Behavior normal.         Fluids    Intake/Output Summary (Last 24 hours) at 11/4/2024 0236  Last data filed at 11/4/2024 0201  Gross per 24 hour   Intake 13.73 ml   Output --   Net 13.73 ml       Laboratory  Recent Results (from the past 48 hours)   CBC WITH DIFFERENTIAL    Collection Time: 11/03/24  5:33 PM   Result Value Ref Range    WBC 9.6 4.0 - 10.0 K/uL    RBC 4.93 4.70 - 6.10 M/uL    Hemoglobin 16.1 14.0 - 18.0 g/dL    Hematocrit 45.4 42.0 - 54.0 %    MCV 92.1 81.0 - 100.0 fL    MCH 32.7 26.0 - 34.0 pg    MCHC 35.5 33.0 - 37.0 g/dL    RDW 12.7 11.5 - 14.5 %    Platelet Count 244 130 - 400 K/uL    MPV 9.0 8.5 - 12.5 fL    Neutrophils-Polys 45 42 - 75 %    Lymphocytes 43 21 - 51 %    Monocytes 8 2 - 12 %    Eosinophils 3 0 - 7 %    Basophils 1 0 - 2 %    Immature Granulocytes 0 0 - 1 %    Neutrophils (Absolute) 4.29 1.50 - 8.00 K/uL    Lymphs (Absolute) 4.08 0.70 - 4.50 K/uL    Monos (Absolute) 0.78 0.10 - 1.00 K/uL    Eos (Absolute) 0.31 0.00 - 0.40 K/uL    Baso (Absolute) 0.10 0.00 - 0.20 K/uL    Immature Granulocytes (abs) 0.02 0.00 - 2.90 K/uL   Basic Metabolic Panel    Collection Time: 11/03/24  9:39 PM   Result Value Ref Range    Sodium 136 135 - 145 mmol/L    Potassium 4.6 3.6 - 5.5 mmol/L    Chloride 104 96 - 112 mmol/L    Co2 23 20 - 33 mmol/L    Glucose 114 (H) 65 - 99 mg/dL    Bun 28 (H) 8 - 22 mg/dL    Creatinine 1.03 0.50 - 1.40 mg/dL    Calcium 9.0 8.4 - 10.2 mg/dL    Anion Gap 9.0 7.0 - 16.0   TROPONIN    Collection Time: 11/03/24  9:39 PM   Result Value Ref Range    Troponin T 107 (H) 6 - 19 ng/L   ESTIMATED GFR    Collection Time: 11/03/24  9:39 PM   Result Value Ref Range    GFR (CKD-EPI) 81 >60 mL/min/1.73 m 2   EKG     Collection Time: 24 10:31 PM   Result Value Ref Range    Report       [facility]    Test Date:  2024  Pt Name:    KEY SHERIDAN              Department: [facility]  MRN:        [id number]                      Room:       3317  Gender:     Male                         Technician: [id number]  :        1960                   Requested By:LOYD CHOUDHARY  Order #:    [id number]                    Reading MD: Edwar Shahid MD    Measurements  Intervals                                Axis  Rate:       70                           P:          41  UT:         161                          QRS:        21  QRSD:       99                           T:          54  QT:         421  QTc:        455    Interpretive Statements  Sinus rhythm  Left atrial enlargement  No previous ECG available for comparison  Electronically Signed On 2024 00:24:03 PST by Edwar Shahid MD     EKG    Collection Time: 24 11:34 PM   Result Value Ref Range    Report       [facility]    Test Date:  2024  Pt Name:    KEY SHERIDAN              Department: [facility]  MRN:        [id number]                      Room:       3317  Gender:     Male                         Technician: [id number]  :        1960                   Requested By:LIVIER VARGAS  Order #:    [id number]                    Reading MD: Edwar Shahid MD    Measurements  Intervals                                Axis  Rate:       65                           P:          46  UT:         164                          QRS:        23  QRSD:       99                           T:          68  QT:         417  QTc:        434    Interpretive Statements  Sinus rhythm  Ventricular premature complex  Compared to ECG 2024 22:31:50  Ventricular premature complex(es) now present  Atrial abnormality no longer present      Electronically Signed On 2024 00:25:12 PST by Edwar Shahid MD     aPTT    Collection Time: 24 12:00 AM    Result Value Ref Range    APTT 22.7 (L) 24.7 - 36.0 sec   Prothrombin Time    Collection Time: 24 12:00 AM   Result Value Ref Range    PT 13.4 12.0 - 14.6 sec    INR 0.99 0.87 - 1.13   Heparin Xa (Unfractionated)    Collection Time: 24 12:00 AM   Result Value Ref Range    Heparin Xa (UFH) <0.10 IU/mL   EKG in four (4) hours    Collection Time: 24  1:25 AM   Result Value Ref Range    Report       [facility]    Test Date:  2024  Pt Name:    KEY SHERIDAN              Department: 161  MRN:        [id number]                      Room:  Gender:     Male                         Technician: ZEN  :        1960                   Requested By:LOYD CHOUDHARY  Order #:    [id number]                    Reading MD:    Measurements  Intervals                                Axis  Rate:       69                           P:          51  TX:         162                          QRS:        48  QRSD:       100                          T:          55  QT:         423  QTc:        454    Interpretive Statements  Sinus rhythm  Compared to ECG 2024 23:34:04  Ventricular premature complex(es) no longer present     EKG    Collection Time: 24  1:26 AM   Result Value Ref Range    Report       [facility]    Test Date:  2024  Pt Name:    KEY SHERIDAN              Department: 161  MRN:        [id number]                      Room:  Gender:     Male                         Technician: ZEN  :        1960                   Requested By:LOYD CHOUDHARY  Order #:    [id number]                    Reading MD:    Measurements  Intervals                                Axis  Rate:       67                           P:          50  TX:         163                          QRS:        49  QRSD:       101                          T:          49  QT:         422  QTc:        446    Interpretive Statements  Sinus rhythm  Consider left atrial enlargement  Compared to ECG  11/03/2024 23:34:04  Ventricular premature complex(es) no longer present     Basic Metabolic Panel (BMP)    Collection Time: 11/04/24  1:31 AM   Result Value Ref Range    Sodium 139 135 - 145 mmol/L    Potassium 4.5 3.6 - 5.5 mmol/L    Chloride 106 96 - 112 mmol/L    Co2 22 20 - 33 mmol/L    Glucose 119 (H) 65 - 99 mg/dL    Bun 30 (H) 8 - 22 mg/dL    Creatinine 0.93 0.50 - 1.40 mg/dL    Calcium 8.7 8.5 - 10.5 mg/dL    Anion Gap 11.0 7.0 - 16.0   Lipid Profile    Collection Time: 11/04/24  1:31 AM   Result Value Ref Range    Cholesterol,Tot 161 100 - 199 mg/dL    Triglycerides 131 0 - 149 mg/dL    HDL 47 >=40 mg/dL    LDL 88 <100 mg/dL   TROPONIN    Collection Time: 11/04/24  1:31 AM   Result Value Ref Range    Troponin T 234 (H) 6 - 19 ng/L   ESTIMATED GFR    Collection Time: 11/04/24  1:31 AM   Result Value Ref Range    GFR (CKD-EPI) 91 >60 mL/min/1.73 m 2       Imaging  EC-ECHOCARDIOGRAM COMPLETE W/O CONT    (Results Pending)   CL-LEFT HEART CATHETERIZATION WITH POSSIBLE INTERVENTION    (Results Pending)       Assessment/Plan  * NSTEMI (non-ST elevated myocardial infarction) (HCC)- (present on admission)  Assessment & Plan  No active chest pain on arrival  Asa 81 s/p 325mg at [facility]  Atorvastatin 80  Nitro drip - titrate to SBP<120  Heparin drip  Metoprolol 50 bid  Echo pending  Trend troponins  Repeat EKG/trop if chest pain recurs  Cardiology following    Prediabetes- (present on admission)  Assessment & Plan  A1c 6.1 in 2022  Repeat a1c    HLD (hyperlipidemia)- (present on admission)  Assessment & Plan  High intensity statin    HTN (hypertension)- (present on admission)  Assessment & Plan  Goal SBP <120; titrate nitro to BP goal  Restart home meds when clinically indicated

## 2024-11-05 ENCOUNTER — PHARMACY VISIT (OUTPATIENT)
Dept: PHARMACY | Facility: MEDICAL CENTER | Age: 64
End: 2024-11-05
Payer: COMMERCIAL

## 2024-11-05 VITALS
RESPIRATION RATE: 18 BRPM | BODY MASS INDEX: 32.51 KG/M2 | DIASTOLIC BLOOD PRESSURE: 78 MMHG | WEIGHT: 227.07 LBS | SYSTOLIC BLOOD PRESSURE: 135 MMHG | TEMPERATURE: 98.4 F | HEIGHT: 70 IN | OXYGEN SATURATION: 94 % | HEART RATE: 74 BPM

## 2024-11-05 PROBLEM — I21.4 NSTEMI, INITIAL EPISODE OF CARE (HCC): Status: RESOLVED | Noted: 2024-11-04 | Resolved: 2024-11-05

## 2024-11-05 PROBLEM — I21.4 NSTEMI (NON-ST ELEVATED MYOCARDIAL INFARCTION) (HCC): Status: RESOLVED | Noted: 2024-11-04 | Resolved: 2024-11-05

## 2024-11-05 LAB
ANION GAP SERPL CALC-SCNC: 11 MMOL/L (ref 7–16)
BUN SERPL-MCNC: 14 MG/DL (ref 8–22)
CALCIUM SERPL-MCNC: 9 MG/DL (ref 8.5–10.5)
CHLORIDE SERPL-SCNC: 106 MMOL/L (ref 96–112)
CO2 SERPL-SCNC: 20 MMOL/L (ref 20–33)
CREAT SERPL-MCNC: 0.75 MG/DL (ref 0.5–1.4)
ERYTHROCYTE [DISTWIDTH] IN BLOOD BY AUTOMATED COUNT: 43.8 FL (ref 35.9–50)
GFR SERPLBLD CREATININE-BSD FMLA CKD-EPI: 100 ML/MIN/1.73 M 2
GLUCOSE SERPL-MCNC: 113 MG/DL (ref 65–99)
HCT VFR BLD AUTO: 39.9 % (ref 42–52)
HGB BLD-MCNC: 14 G/DL (ref 14–18)
LV EJECT FRACT  99904: 55
LV EJECT FRACT MOD 2C 99903: 51.77
LV EJECT FRACT MOD 4C 99902: 57.17
LV EJECT FRACT MOD BP 99901: 53.04
MCH RBC QN AUTO: 32.6 PG (ref 27–33)
MCHC RBC AUTO-ENTMCNC: 35.1 G/DL (ref 32.3–36.5)
MCV RBC AUTO: 92.8 FL (ref 81.4–97.8)
PLATELET # BLD AUTO: 158 K/UL (ref 164–446)
PMV BLD AUTO: 8.8 FL (ref 9–12.9)
POTASSIUM SERPL-SCNC: 3.5 MMOL/L (ref 3.6–5.5)
RBC # BLD AUTO: 4.3 M/UL (ref 4.7–6.1)
SODIUM SERPL-SCNC: 137 MMOL/L (ref 135–145)
WBC # BLD AUTO: 8.9 K/UL (ref 4.8–10.8)

## 2024-11-05 PROCEDURE — 93306 TTE W/DOPPLER COMPLETE: CPT | Mod: 26 | Performed by: INTERNAL MEDICINE

## 2024-11-05 PROCEDURE — 700102 HCHG RX REV CODE 250 W/ 637 OVERRIDE(OP): Performed by: INTERNAL MEDICINE

## 2024-11-05 PROCEDURE — A9270 NON-COVERED ITEM OR SERVICE: HCPCS

## 2024-11-05 PROCEDURE — 80048 BASIC METABOLIC PNL TOTAL CA: CPT

## 2024-11-05 PROCEDURE — 85027 COMPLETE CBC AUTOMATED: CPT

## 2024-11-05 PROCEDURE — 97161 PT EVAL LOW COMPLEX 20 MIN: CPT

## 2024-11-05 PROCEDURE — A9270 NON-COVERED ITEM OR SERVICE: HCPCS | Performed by: INTERNAL MEDICINE

## 2024-11-05 PROCEDURE — 99232 SBSQ HOSP IP/OBS MODERATE 35: CPT | Performed by: INTERNAL MEDICINE

## 2024-11-05 PROCEDURE — 97535 SELF CARE MNGMENT TRAINING: CPT

## 2024-11-05 PROCEDURE — 99239 HOSP IP/OBS DSCHRG MGMT >30: CPT | Performed by: INTERNAL MEDICINE

## 2024-11-05 PROCEDURE — RXMED WILLOW AMBULATORY MEDICATION CHARGE: Performed by: INTERNAL MEDICINE

## 2024-11-05 PROCEDURE — 700102 HCHG RX REV CODE 250 W/ 637 OVERRIDE(OP)

## 2024-11-05 RX ORDER — ATORVASTATIN CALCIUM 80 MG/1
80 TABLET, FILM COATED ORAL DAILY
Qty: 30 TABLET | Refills: 0 | Status: SHIPPED | OUTPATIENT
Start: 2024-11-05

## 2024-11-05 RX ORDER — PRASUGREL 10 MG/1
10 TABLET, FILM COATED ORAL DAILY
Qty: 30 TABLET | Refills: 0 | Status: SHIPPED | OUTPATIENT
Start: 2024-11-06

## 2024-11-05 RX ORDER — LOSARTAN POTASSIUM 50 MG/1
50 TABLET ORAL NIGHTLY
Status: DISCONTINUED | OUTPATIENT
Start: 2024-11-05 | End: 2024-11-05 | Stop reason: HOSPADM

## 2024-11-05 RX ORDER — METOPROLOL TARTRATE 50 MG
50 TABLET ORAL 2 TIMES DAILY
Qty: 60 TABLET | Refills: 0 | Status: SHIPPED | OUTPATIENT
Start: 2024-11-05

## 2024-11-05 RX ADMIN — ATORVASTATIN CALCIUM 80 MG: 80 TABLET, FILM COATED ORAL at 05:37

## 2024-11-05 RX ADMIN — PRASUGREL 10 MG: 10 TABLET, FILM COATED ORAL at 05:37

## 2024-11-05 RX ADMIN — ASPIRIN 81 MG: 81 TABLET, COATED ORAL at 05:37

## 2024-11-05 RX ADMIN — METOPROLOL TARTRATE 50 MG: 50 TABLET, FILM COATED ORAL at 05:37

## 2024-11-05 ASSESSMENT — PAIN DESCRIPTION - PAIN TYPE
TYPE: ACUTE PAIN
TYPE: ACUTE PAIN

## 2024-11-05 ASSESSMENT — ENCOUNTER SYMPTOMS
HEADACHES: 0
DIZZINESS: 0
NAUSEA: 0
CHEST TIGHTNESS: 0
PALPITATIONS: 0
SHORTNESS OF BREATH: 0
ABDOMINAL PAIN: 0

## 2024-11-05 ASSESSMENT — COGNITIVE AND FUNCTIONAL STATUS - GENERAL
SUGGESTED CMS G CODE MODIFIER MOBILITY: CH
MOBILITY SCORE: 24

## 2024-11-05 ASSESSMENT — GAIT ASSESSMENTS
DISTANCE (FEET): 600
GAIT LEVEL OF ASSIST: SUPERVISED

## 2024-11-05 NOTE — DISCHARGE INSTR - DIET
Heart-Healthy Consistent Carbohydrate Nutrition Therapy    A heart-healthy and consistent carbohydrate diet is recommended to manage heart disease and diabetes.  To follow a heart-healthy and consistent carbohydrate diet,  Eat a balanced diet with whole grains, fruits and vegetables, and lean protein sources.  Choose heart-healthy unsaturated fats. Limit saturated fats, trans fats, and cholesterol intake. Eat more plant-based or vegetarian meals using beans and soy foods for protein.  Eat whole, unprocessed foods to limit the amount of sodium (salt) you eat.  Choose a consistent amount of carbohydrate at each meal and snack. Limit refined carbohydrates especially sugar, sweets and sugar-sweetened beverages.  If you drink alcohol, do so in moderation: one serving per day (women) and two servings per day (men).  o One serving is equivalent to 12 ounces beer, 5 ounces wine, or 1.5 ounces distilled spirits    Tips for Choosing Heart-Healthy Fats    Choose lean protein and low-fat dairy foods to reduce saturated fat intake.  Saturated fat is usually found in animal-based protein and is associated with certain health risks. Saturated fat is the biggest contributor to raise low-density lipoprotein (LDL) cholesterol levels. Research shows that limiting saturated fat lowers unhealthy cholesterol levels. Eat no more than 7% of your total calories each day from saturated fat. Ask your RDN to help you determine how much saturated fat is right for you.  There are many foods that do not contain large amounts of saturated fats. Swapping these foods to replace foods high in saturated fats will help you limit the saturated fat you eat and improve your cholesterol levels. You can also try eating more plant-based or vegetarian meals.    Instead of… Try:   Whole milk, cheese, yogurt, and ice cream 1% or skim milk, low-fat cheese, non-fat yogurt, and low-fat ice cream   Fatty, marbled beef and pork Lean beef, pork, or venison   Poultry  with skin Poultry without skin   Butter, stick margarine Reduced-fat, whipped, or liquid spreads   Coconut oil, palm oil Liquid vegetable oils: corn, canola, olive, soybean and safflower oils     Avoid foods that contain trans fats.  Trans fats increase levels of LDL-cholesterol. Hydrogenated fat in processed foods is the main source of trans fats in foods.   Trans fats can be found in stick margarine, shortening, processed sweets, baked goods, some fried foods, and packaged foods made with hydrogenated oils. Avoid foods with “partially hydrogenated oil” on the ingredient list such as: cookies, pastries, baked goods, biscuits, crackers, microwave popcorn, and frozen dinners.  Choose foods with heart healthy fats.  Polyunsaturated and monounsaturated fat are unsaturated fats that may help lower your blood cholesterol level when used in place of saturated fat in your diet.  Ask your RDN about taking a dietary supplement with plant sterols and stanols to help lower your cholesterol level.  Research shows that substituting saturated fats with unsaturated fats is beneficial to cholesterol levels. Try these easy swaps:     Instead of… Try:   Butter, stick margarine, or solid shortening Reduced-fat, whipped, or liquid spreads   Beef, pork, or poultry with skin    Fish and seafood   Chips, crackers, snack foods Raw or unsalted nuts and seeds or nut butters  Hummus with vegetables  Avocado on toast   Coconut oil, palm oil Liquid vegetable oils: corn, canola, olive, soybean and safflower oils      Limit the amount of cholesterol you eat to less than 200 milligrams per day.  Cholesterol is a substance carried through the bloodstream via lipoproteins, which are known as “transporters” of fat. Some body functions need cholesterol to work properly, but too much cholesterol in the bloodstream can damage arteries and build up blood vessel linings (which can lead to heart attack and stroke). You should eat less than 200 milligrams  cholesterol per day.  People respond differently to eating cholesterol. There is no test available right now that can figure out which people will respond more to dietary cholesterol and which will respond less. For individuals with high intake of dietary cholesterol, different types of increase (none, small, moderate, large) in LDL-cholesterol levels are all possible.    Food sources of cholesterol include egg yolks and organ meats such as liver, gizzards.  Limit egg yolks to two to four per week and avoid organ meats like liver and gizzards to control cholesterol intake.    Tips for Choosing Heart-Healthy Carbohydrates  Consume a consistent amount of carbohydrate  It is important to eat foods with carbohydrates in moderation because they impact your blood glucose level. Carbohydrates can be found in many foods such as:  Grains (breads, crackers, rice, pasta, and cereals)  Starchy Vegetables (potatoes, corn, and peas)  Beans and legumes  Milk, soy milk, and yogurt  Fruit and fruit juice  Sweets (cakes, cookies, ice cream, jam and jelly)  Your RDN will help you set a goal for how many carbohydrate servings to eat at your meals and snacks. For many adults, eating 3 to 5 servings of carbohydrate foods at each meal and 1 or 2 carbohydrate servings for each snack works well.  Check your blood glucose level regularly. It can tell you if you need to adjust when you eat carbohydrates.    Choose foods rich in viscous (soluble) fiber  Viscous, or soluble, is found in the walls of plant cells. Viscous fiber is found only in plant-based foods. Eating foods with fiber helps to lower your unhealthy cholesterol and keep your blood glucose in range  Rich sources of viscous fiber include vegetables (asparagus, Spivey sprouts, sweet potatoes, turnips) fruit (apricots, mangoes, oranges), legumes, and whole grains (barley, oats, and oat bran).  As you increase your fiber intake gradually, also increase the amount of water you drink.  This will help prevent constipation.  If you have difficulty achieving this goal, ask your RDN about fiber laxatives. Choose fiber supplements made with viscous fibers such as psyllium seed husks or methylcellulose to help lower unhealthy cholesterol.     Limit refined carbohydrates  There are three types of carbohydrates: starches, sugar, and fiber. Some carbohydrates occur naturally in food, like the starches in rice or corn or the sugars in fruits and milk. Refined carbohydrates--foods with high amounts of simple sugars--can raise triglyceride levels. High triglyceride levels are associated with coronary heart disease.  Some examples of refined carbohydrate foods are table sugar, sweets, and beverages sweetened with added sugar.    Tips for Reducing Sodium (Salt)  Although sodium is important for your body to function, too much sodium can be harmful for people with high blood pressure. As sodium and fluid buildup in your tissues and bloodstream, your blood pressure increases. High blood pressure may cause damage to other organs and increase your risk for a stroke.  Even if you take a pill for blood pressure or a water pill (diuretic) to remove fluid, it is still important to have less salt in your diet. Ask your doctor and RDN what amount of sodium is right for you.  Avoid processed foods. Eat more fresh foods.  Fresh fruits and vegetables are naturally low in sodium, as well as frozen vegetables and fruits that have no added juices or sauces.  Fresh meats are lower in sodium than processed meats, such as moon, sausage, and hotdogs. Read the nutrition label or ask your  to help you find a fresh meat that is low in sodium.  Eat less salt--at the table and when cooking.  A single teaspoon of table salt has 2,300 mg of sodium.  Leave the salt out of recipes for pasta, casseroles, and soups.  Ask your RDN how to cook your favorite recipes without sodium  Be a smart .  Look for food packages that say  “salt-free” or “sodium-free.” These items contain less than 5 milligrams of sodium per serving.  “Very low-sodium” products contain less than 35 milligrams of sodium per serving.  “Low-sodium” products contain less than 140 milligrams of sodium per serving.  Beware for “Unsalted” or “No Added Salt” products. These items may still be high in sodium. Check the nutrition label.  Add flavors to your food without adding sodium.  Try lemon juice, lime juice, fruit juice or vinegar.  Dry or fresh herbs add flavor. Try basil, bay leaf, dill, rosemary, parsley, bob, dry mustard, nutmeg, thyme, and paprika.  Pepper, red pepper flakes, and cayenne pepper can add spice t your meals without adding sodium. Hot sauce contains sodium, but if you use just a drop or two, it will not add up to much.  Buy a sodium-free seasoning blend or make your own at home.    Additional Lifestyle Tips  Achieve and maintain a healthy weight  Talk with your RDN or your doctor about what is a healthy weight for you.  Set goals to reach and maintain that weight.   To lose weight, reduce your calorie intake along with increasing your physical activity. A weight loss of 10 to 15 pounds could reduce LDL-cholesterol by 5 milligrams per deciliter.    Participate in physical activity  Talk with your health care team to find out what types of physical activity are best for you. Set a plan to get about 30 minutes of exercise on most days.    Food Group Foods Recommended   Grains Whole grain breads and cereals, including whole wheat, barley, rye, buckwheat, corn, teff, quinoa, millet, amaranth, brown or wild rice, sorghum, and oats  Pasta, especially whole wheat or other whole grain types  Brown rice, quinoa or wild rice  Whole grain crackers, bread, rolls, pitas  Home-made bread with reduced-sodium baking soda   Protein Foods Lean cuts of beef and pork (loin, leg, round, extra lean hamburger)  Skinless poultry  Fish  Venison and other wild game  Dried beans  and peas  Nuts and nut butters  Meat alternatives made with soy or textured vegetable protein  Egg whites or egg substitute  Cold cuts made with lean meat or soy protein   Dairy Nonfat (skim), low-fat, or 1%-fat milk  Nonfat or low-fat yogurt or cottage cheese  Fat-free and low-fat cheese   Vegetables Fresh, frozen, or canned vegetables without added fat or salt   Fruits Fresh, frozen, canned, or dried fruit   Oils Unsaturated oils (corn, olive, peanut, soy, sunflower, canola)  Soft or liquid margarines and vegetable oil spreads  Salad dressings  Seeds and nuts  Avocado     Foods Not Recommended  Food Group Foods Not Recommended   Grains Breads or crackers topped with salt  Cereals (hot or cold) with more than 300 mg sodium per serving  Biscuits, cornbread, and other “quick” breads prepared with baking soda  Bread crumbs or stuffing mix from a store  High-fat bakery products, such as doughnuts, biscuits, croissants, [place] pastries, pies, cookies  Instant cooking foods to which you add hot water and stir--potatoes, noodles, rice, etc.  Packaged starchy foods--seasoned noodle or rice dishes, stuffing mix, macaroni and cheese dinner  Snacks made with partially hydrogenated oils, including chips, cheese puffs, snack mixes, regular crackers, butter-flavored popcorn   Protein Foods Higher-fat cuts of meats (ribs, t-bone steak, regular hamburger)  Rosas, sausage, or hot dogs  Cold cuts, such as salami or bologna, deli meats, cured meats, corned beef  Organ meats (liver, brains, gizzards, sweetbreads)  Poultry with skin  Fried or smoked meat, poultry, and fish  Whole eggs and egg yolks (more than 2-4 per week)  Salted legumes, nuts, seeds, or nut/seed butters  Meat alternatives with high levels of sodium (>300 mg per serving) or saturated fat (>5 g per serving)   Dairy Whole milk, 2% fat milk, buttermilk  Whole milk yogurt or ice cream  Cream  Half-&-half  Cream cheese  Sour cream  Cheese   Vegetables Canned or frozen  vegetables with salt, fresh vegetables prepared with salt, butter, cheese, or cream sauce  Fried vegetables  Pickled vegetables such as olives, pickles, or sauerkraut   Fruits Fried fruits  Fruits served with butter or cream   Oils Butter, stick margarine, shortening  Partially hydrogenated oils or trans fats  Tropical oils (coconut, palm, palm kernel oils)   Other Candy, sugar sweetened soft drinks and desserts  Salt, sea salt, garlic salt, and seasoning mixes containing salt  Bouillon cubes  Ketchup, barbecue sauce, Worcestershire sauce, soy sauce, teriyaki sauce  Miso  Salsa  Pickles, olives, relish     Heart Healthy Consistent Carbohydrate Sample 1-Day Menu   Breakfast 1 cup cooked oatmeal (2 carbohydrate servings)  3/4 cup blueberries (1 carbohydrate serving)  1 ounce almonds  1 cup skim milk (1 carbohydrate serving)  1 cup coffee   Morning Snack 1 cup sugar-free nonfat yogurt (1 carbohydrate serving)   Lunch 2 slices whole-wheat bread (2 carbohydrate servings)  2 ounces lean turkey breast  1 ounce low-fat Swiss cheese  1 teaspoon mustard  1 slice tomato  1 lettuce leaf  1 small pear (1 carbohydrate serving)  1 cup skim milk (1 carbohydrate serving)   Afternoon Snack 1 ounce trail mix with unsalted nuts, seeds, and raisins (1 carbohydrate serving)   Evening Meal 3 ounces salmon  2/3 cup cooked brown rice (2 carbohydrate servings)  1 teaspoon soft margarine  1 cup cooked broccoli with 1/2 cup cooked carrots (1 carbohydrate serving  Carrots, cooked, boiled, drained, without salt  1 cup lettuce  1 teaspoon olive oil with vinegar for dressing  1 small whole grain roll (1 carbohydrate serving)  1 teaspoon soft margarine  1 cup unsweetened tea   Evening Snack 1 extra-small banana (1 carbohydrate serving)         Nutrition Counseling  Our expert team offers:   Medical Nutrition Therapy for Chronic Conditions   Weight Management   Diabetes Education and Management   Wellness Services   Body Composition Measurements    Gastrointestinal Health    [facility] are located at:  [address], [place] [address]  For more information and to schedule a consultation, please call [phone].  A physician referral may be required by your insurance for coverage.

## 2024-11-05 NOTE — DISCHARGE INSTRUCTIONS
DISCHARGE INSTRUCTIONS PER Chato Saini M.D.    Diagnosis: NSTEMI    Please take your medications as prescribed to you.  Please check your blood pressure twice a day and make a blood pressure log and bring it to your primary care appointment.  Please follow up with cardiology.  I discontinued your outpatient ibuprofen as Asprin and Effient and ibuprofen can also act as a blood thinner. Please take tylenol or talk to your primary care provider.  For any medical emergency please go to the nearest emergency room or call 911.        Heart Attack  A heart attack occurs when blood and oxygen supply to the heart is cut off. A heart attack can cause damage to the heart that cannot be fixed. A heart attack is also called a myocardial infarction, or MI. If you think you are having a heart attack, do not wait to see if the symptoms will go away. Get medical help right away.  What are the causes?  This condition may be caused by:  A fatty substance (plaque) in the blood vessels (arteries). This can block the flow of blood to the heart.  A blood clot in the blood vessels that go to the heart. The blood clot blocks blood flow.  An abnormal heartbeat.  Some diseases, such as problems in red blood cells (anemia)orproblems in breathing (respiratory failure).  Tightening (spasm) of a blood vessel that cuts off blood to the heart.  A tear in a blood vessel of the heart.  Other causes may include:  Using drugs such as cocaine or methamphetamine.  Low blood pressure.  What increases the risk?  Aging. The risk gets higher as you get older.  Having a personal or family history of chest pain, heart attack, stroke, or narrowing of the arteries in the legs, arms, head, or stomach (peripheral vascular disease).  Having taken chemotherapy or immune-suppressing medicines.  Being male.  Being overweight or obese.  Having any of these conditions:  High blood pressure.  High cholesterol.  Diabetes.  Making lifestyle choices such  as:  Drinking too much alcohol.  Not getting regular exercise.  Smoking.  What are the signs or symptoms?  Chest pain. It may feel like:  Crushing or squeezing.  Tightness, pressure, fullness, or heaviness.  Pain in the arm, neck, jaw, back, or upper body.  Heartburn.  Upset stomach (indigestion).  Shortness of breath.  Feeling like you may vomit (nauseous).  Cold sweats.  Sudden light-headedness, dizziness, or passing out.  Feeling tired.  How is this treated?  A heart attack must be treated as soon as possible. Treatment may include:  Medicines to:  Break up or dissolve blood clots.  Thin your blood and help prevent blood clots.  Treat blood pressure.  Improve blood flow to the heart.  Reduce pain.  Reduce cholesterol.  Procedures to widen a blocked artery and keep it open.  Open heart surgery.  Making your heart strong again (cardiac rehabilitation) through exercise, education, and counseling.  Follow these instructions at home:  Medicines  Take over-the-counter and prescription medicines only as told by your doctor.  Do not take these medicines unless your doctor says it is okay:  NSAIDs, such as ibuprofen, naproxen, or celecoxib.  Any vitamins or supplements.  Hormone replacement therapy that has estrogen with or without progestin.  If you are taking blood thinners:  Talk with your doctor before taking any medicines that have aspirin or NSAIDs, such as ibuprofen.  Take medicines exactly as told. Take them at the same time each day.  Avoid doing things that could hurt or bruise you. Take action to prevent falls.  Wear an alert bracelet or carry a card that shows you are taking blood thinners.  Lifestyle    Do not smoke or use any products that contain nicotine or tobacco. If you need help quitting, ask your doctor.  Avoid secondhand smoke.  Exercise regularly. Ask your doctor about a cardiac rehab program.  Eat heart-healthy foods. Your doctor will tell you what foods to eat.  Stay at a healthy weight.  Learn  ways to lower your stress level.  Do not use illegal drugs.  Alcohol use  Do not drink alcohol if:  Your doctor tells you not to drink.  You are pregnant, may be pregnant, or are planning to become pregnant.  If you drink alcohol:  Limit how much you have to:  0-1 drink a day for women.  0-2 drinks a day for men.  Know how much alcohol is in your drink. In the [place] one drink equals one 12 oz bottle of beer (355 mL), one 5 oz glass of wine (148 mL), or one 1½ oz glass of hard liquor (44 mL).  General instructions  Work with your doctor to treat other problems you may have, such as diabetes or high blood pressure.  Get screened for depression. Get treatment if needed.  Keep your vaccines up to date. Get the flu shot (influenza vaccine) every year.  Keep all follow-up visits.  Contact a doctor if:  You feel very sad.  You have trouble doing your daily activities.  You get light-headed or dizzy.  Get help right away if:  You have sudden, unexplained discomfort in your chest, arms, back, neck, jaw, or upper body.  You have shortness of breath.  You have sudden sweating or clammy skin.  You feel like you may vomit or you vomit.  You feel tired or weak.  You feel your heart beating fast.  You feel your heart skipping beats.  You have blood pressure that is higher than 180/120.  These symptoms may be an emergency. Get help right away. Call your local emergency services (911 in the [place]  Do not wait to see if the symptoms will go away.  Do not drive yourself to the hospital.  Summary  A heart attack occurs when blood and oxygen supply to the heart is cut off.  Do not take NSAIDs unless your doctor says it is okay.  Do not smoke. Avoid secondhand smoke.  Exercise regularly. Ask your doctor about a cardiac rehab program.  This information is not intended to replace advice given to you by your health care provider. Make sure you discuss any questions you have with your health care provider.  Document Revised: 06/09/2022  Document Reviewed: 06/09/2022  [employer] Patient Education © 2023 [employer]

## 2024-11-05 NOTE — PROGRESS NOTES
Cardiology Follow Up Progress Note    Date of Service  11/5/2024    Attending Physician  Regis Elizalde M.D.    HPI  Mode Zhao is a 64 y.o. male admitted 11/4/2024 with prior history of hypertension and dyslipidemia followed by Lulu Verma transferred from [facility] for management of NSTEMI.  Follows an active lifestyle including hiking.  No prior heart disease.    Interim Events  11/4: /70.  HR 65.  2/10 chest pain dramatically improved compared to initial chest pain symptoms.  11/5: /93.  HR 84.  Tolerated PCI yesterday.      Review of Systems  Review of Systems   Respiratory:  Negative for chest tightness and shortness of breath.    Cardiovascular:  Negative for palpitations.   Gastrointestinal:  Negative for abdominal pain and nausea.   Neurological:  Negative for dizziness and headaches.       Vital signs in last 24 hours  Temp:  [36.7 °C (98 °F)-36.9 °C (98.4 °F)] 36.9 °C (98.4 °F)  Pulse:  [59-91] 84  Resp:  [15-41] 19  BP: (101-143)/(54-84) 135/81  SpO2:  [92 %-98 %] 92 %    Physical Exam  Physical Exam  Constitutional:       General: He is not in acute distress.  Cardiovascular:      Rate and Rhythm: Normal rate and regular rhythm.      Heart sounds: Normal heart sounds, S1 normal and S2 normal. No murmur heard.     No friction rub. No gallop.   Pulmonary:      Effort: Pulmonary effort is normal.      Breath sounds: Normal breath sounds. No wheezing, rhonchi or rales.   Musculoskeletal:      Right lower leg: No edema.      Left lower leg: No edema.   Skin:     General: Skin is warm and dry.   Neurological:      Mental Status: He is alert and oriented to person, place, and time.   Psychiatric:         Behavior: Behavior normal.         Lab Review  Lab Results   Component Value Date/Time    WBC 8.9 11/05/2024 03:00 AM    RBC 4.30 (L) 11/05/2024 03:00 AM    HEMOGLOBIN 14.0 11/05/2024 03:00 AM    HEMATOCRIT 39.9 (L) 11/05/2024 03:00 AM    MCV 92.8 11/05/2024 03:00 AM    MCH  "32.6 11/05/2024 03:00 AM    MCHC 35.1 11/05/2024 03:00 AM    MPV 8.8 (L) 11/05/2024 03:00 AM      Lab Results   Component Value Date/Time    SODIUM 137 11/05/2024 03:00 AM    POTASSIUM 3.5 (L) 11/05/2024 03:00 AM    CHLORIDE 106 11/05/2024 03:00 AM    CO2 20 11/05/2024 03:00 AM    GLUCOSE 113 (H) 11/05/2024 03:00 AM    BUN 14 11/05/2024 03:00 AM    CREATININE 0.75 11/05/2024 03:00 AM      Lab Results   Component Value Date/Time    ASTSGOT 17 09/14/2017 06:15 AM    ALTSGPT 24 09/14/2017 06:15 AM     Lab Results   Component Value Date/Time    CHOLSTRLTOT 161 11/04/2024 01:31 AM    LDL 88 11/04/2024 01:31 AM    HDL 47 11/04/2024 01:31 AM    TRIGLYCERIDE 131 11/04/2024 01:31 AM    TROPONINT 475 (H) 11/04/2024 10:18 AM       No results for input(s): \"NTPROBNP\" in the last 72 hours.    Cardiac Imaging and Procedures Review  EKG:  My personal interpretation of the EKG dated 11/4/2024 is sinus rhythm with no ischemic symptoms    Rhythm: My personal interpretation of the rhythm dated 11/5/2024 sinus rhythm    Echocardiogram: 11/4/2024  Mild concentric left ventricular hypertrophy.  Normal left ventricular systolic function.  The left ventricular ejection fraction is visually estimated to be 55%.  The right ventricle is mildly dilated. Normal right ventricular   systolic function.  Mild aortic insufficiency.  Mild tricuspid regurgitation.  The ascending aorta diameter is 3.7 cm.    Cardiac Catheterization: 11/4/2024  Angioplasty and placement of a 4.0 by 32 mm, 3.5 by 20 mm Synergy Megatraon drug-eluting stents in mid  left anterior descending coronary artery.        Imaging  Chest X-Ray: 11/20/2024 [place]  1. Mild elevation of the left hemidiaphragm without overlying acute airspace disease, pleural effusion or   pneumothorax.     Assessment  NSTEMI  PCI mid LAD (4.0 x 32 mm, 3.5 x 20 mm BEAN) 11/4/2024  Hypertension  Dyslipidemia    Recommendation Discussion  Clinically stable from a cardiac standpoint post MI post " PCI  Reviewed results of coronary angiography and PCI  Stable for discharge on current therapy  Optimize secondary ASCVD management LDL <70, optimally <55, target BP <130/80, dietary modification with target BMI <25, moderate exercise 30 minutes 5 days a week with no 2 consecutive days off  Stable for discharge, cardiology will sign off, follow-up appointment to be arranged Chato Saini M.D.     Thank you for allowing me to participate in the care of this patient.    Reviewed and discussed with Regis Elizalde M.D.     Please contact me with any questions.    Valentin Murray M.D.   Cardiologist, [facility]  [phone]

## 2024-11-05 NOTE — THERAPY
Physical Therapy   Initial Evaluation     Patient Name: Kendrick Zhao  Age:  64 y.o., Sex:  male  Medical Record #: [id number]  Today's Date: 11/5/2024     Precautions  Precautions: Cardiac Precautions (See Comments)    Assessment  Patient is 64 y.o. male with a diagnosis of NSTEMI, now s/p Angioplasty and placement of a 4.0 by 32 mm, 3.5 by 20 mm Synergy Megatraon drug-eluting stents in mid  left anterior descending coronary artery..  Additional factors influencing patient status / progress : today, pt shows good tolerance for activity, with HR 74 at rest, 95 with ambulation, /78  at rest, 136/73 in sitting after ambulation. Pt was educated today re phase I CR with handout given and reviewed. Topics covered include gradual return to his exercise habit with gentle progression in distance, using RPE scale and/or HR to monitor pace. Phase II CR was introduced. Pt reports that he thinks he needs to make some dietary changes and appears motivated for that. Pt asks good questions, all were addressed. See details below.      Plan         DC Equipment Recommendations: None  Discharge Recommendations: Other - (phase II CR)         Objective       11/05/24 1300   Initial Contact Note    Initial Contact Note Order Received and Verified, Evaluation Only - Patient Does Not Require Further Acute Physical Therapy at this Time.  However, May Benefit from Post Acute Therapy for Higher Level Functional Deficits.   Precautions   Precautions Cardiac Precautions (See Comments)   Vitals   Pulse 74  (at rest, 95 with walking)   Blood Pressure 135/78  (in bed, 136/73 EOB after walking.)   Pulse Oximetry 94 %   O2 Delivery Device None - Room Air   Pain 0 - 10 Group   Therapist Pain Assessment During Activity;Nurse Notified;0   Prior Living Situation   Prior Services None   Housing / Facility 2 Story House   Steps In Home   (flight)   Equipment Owned None   Lives with - Patient's Self Care Capacity Spouse   Prior Level of  Functional Mobility   Bed Mobility Independent   Transfer Status Independent   Ambulation Independent   Ambulation Distance community   Assistive Devices Used None   Stairs Independent   Comments hikes for exercise, recently a 22 mile hike in one day with friend at [place].   Cognition    Cognition / Consciousness WDL   Level of Consciousness Alert   Active ROM Lower Body    Active ROM Lower Body  WDL   Strength Lower Body   Lower Body Strength  WDL   Balance Assessment   Sitting Balance (Static) Good   Sitting Balance (Dynamic) Good   Standing Balance (Static) Good   Standing Balance (Dynamic) Good   Weight Shift Sitting Good   Weight Shift Standing Good   Comments with no AD   Bed Mobility    Supine to Sit Supervised   Sit to Supine Supervised   Scooting Supervised   Rolling Supervised   Gait Analysis   Gait Level Of Assist Supervised   Assistive Device None   Distance (Feet) 600   # of Times Distance was Traveled 1   # of Stairs Climbed 10   Level of Assist with Stairs Supervised   Comments mild SOB with stairs.   Functional Mobility   Sit to Stand Supervised   Bed, Chair, Wheelchair Transfer Supervised   6 Clicks Assessment - How much HELP from from another person do you currently need... (If the patient hasn't done an activity recently, how much help from another person do you think he/she would need if he/she tried?)   Turning from your back to your side while in a flat bed without using bedrails? 4   Moving from lying on your back to sitting on the side of a flat bed without using bedrails? 4   Moving to and from a bed to a chair (including a wheelchair)? 4   Standing up from a chair using your arms (e.g., wheelchair, or bedside chair)? 4   Walking in hospital room? 4   Climbing 3-5 steps with a railing? 4   6 clicks Mobility Score 24   Activity Tolerance   Standing 10+   Education Group   Education Provided Cardiac Precautions   Cardiac Precautions Patient Response Patient;Acceptance;Explanation;Verbal  Demonstration   Additional Comments phase I CR education handout given, reviewed.   Anticipated Discharge Equipment and Recommendations   DC Equipment Recommendations None   Discharge Recommendations Other -  (phase II CR)   Interdisciplinary Plan of Care Collaboration   IDT Collaboration with  Nursing   Patient Position at End of Therapy In Bed;Call Light within Reach;Tray Table within Reach;Phone within Reach   Collaboration Comments nsg updated   Session Information   Date / Session Number  11/5-1x only, dc needs only

## 2024-11-05 NOTE — DIETARY
Nutrition Services: Diet Education Consult   Day 1 of admit.  Kendrick Zhao is a 64 y.o. male with admitting DX of NSTEMI (non-ST elevated myocardial infarction) (HCC) [I21.4]  NSTEMI, initial episode of care ([facility]) [I21.4]    RD received referral for cardiac diet education. Dx list includes NSTEMI, HLD, HTN, pre-DM; last A1c on MAR = 6.1% (10/2022). RD provided information via discharge instructions which includes nutrition recommendations and tips to support a heart healthy consistent CHO dietary pattern. This includes outpatient resources to [facility] affiliated Nutrition Program for continued nutrition education and guidance as desired.     No other education needs identified at this time. Please re-consult RD for supplemental education or at the request of patient.    Please re-consult RD PRN

## 2024-11-05 NOTE — CARE PLAN
The patient is Stable - Low risk of patient condition declining or worsening    Shift Goals  Clinical Goals: Pain control, go to cath lab  Patient Goals: Go to cath lab  Family Goals: Updates    Progress made toward(s) clinical / shift goals:  Off nitro    Patient is not progressing towards the following goals: n/a      Problem: Knowledge Deficit - Standard  Goal: Patient and family/care givers will demonstrate understanding of plan of care, disease process/condition, diagnostic tests and medications  Outcome: Progressing     Problem: Pain - Standard  Goal: Alleviation of pain or a reduction in pain to the patient’s comfort goal  Outcome: Progressing      Chest pain free off of nitro

## 2024-11-05 NOTE — PROGRESS NOTES
Patient arrived from cath lab with RN on zoll. Attached to monitoring equipment. Nitro at 200mcg/min. TR band on R radial with 13cc. R groin site clean, dry and soft. Neurovascular checks complete. Patient c/o 3/10 dull CP mid sternal radiating between shoulder blades. Oriented to room and call light.

## 2024-11-05 NOTE — PROGRESS NOTES
Assumed care of patient and received report from NOC shift RN. A&O x 4. VSS.  Plan of care discussed with patient and verbalized understanding. Bed locked and in lowest position. Pt educated to fall risk and fall precautions in place. Call light within reach. All questions answered and no other needs indicated at this time.

## 2024-11-06 NOTE — DISCHARGE SUMMARY
"Discharge Summary    CHIEF COMPLAINT ON ADMISSION  No chief complaint on file.      Reason for Admission  chest pain     Admission Date  11/4/2024    CODE STATUS  Prior    HPI & HOSPITAL COURSE  As per HPI  \"Kendrick Zhao is a 64 y.o. male who presented 11/4/2024 with chest pain.  Mr. Zhao has a past medical history of hypertension on Cozaar and dyslipidemia on aspirin and lipitor that presented to the ER at [place] with chest pain. He was transferred to [facility] and admitted by Dr. Jesus. He developed severe chest pain with an increase in his troponin to 107 for which he was initiated on an IV heparin drip and IV nitroglycerin drip and transferred to [facility] for ICU admission and cardiology consultation. He is still requiring PRN IV morphine for ongoing chest pressure. He notes + nausea. His wife is at bedside. He is scheduled for left heart catheterization.\"     He underwent coronary catheterization on November 4, 2024 that showed single-vessel coronary disease.  Successful percutaneous intervention of left anterior descending coronary artery.     Cardiac cath report showed     \"Severe calcified vessel.  This was serially dilated using 3.5 x 20 NC balloon.  Distal vessel is smaller than the proximal vessel that is why I decided to place 2 stents.  A 4.5 x 32 mm Synergy Megatron drug-eluting stent was placed in mid LAD.  A 3.5 x 20 mm Synergy Megatron drug-eluting stent was placed in distal LAD in overlapping fashion.  IVUS performed showed good expansion of the stent except in very proximal portion.  This was postdilated using 4.5 x 12 mm NC balloon with good result.\"      Today I evaluated and examined him at the bedside.  He reported that he is feeling better and denies complaint of chest pain, shortness of breath, nausea and vomiting or any other acute complaints.  I discussed plan of care with him and I discussed about his discharge medications.  I strongly " recommended him to take his medication as prescribed to him.  I discontinued his ibuprofen as he is now on dual antiplatelet therapy.  I discussed plan of care with cardiologist for his discharge.    I strongly recommended him to check his blood pressure twice a day and make a blood pressure log and bring it to his primary care provider appointment and he expressed understanding.    Later this morning I reevaluated him again at the bedside and now discussed plan of care with patient's wife and updated her regarding current medical condition and plan of care.    Today on the day of discharge he denies any acute complaints and diffusedly to be discharged.    Therefore, he is discharged in fair and stable condition to home with close outpatient follow-up.    The patient recovered much more quickly than anticipated on admission.    Discharge Date  11/5/2024    FOLLOW UP ITEMS POST DISCHARGE  Primary care provider  Cardiology    DISCHARGE DIAGNOSES  Principal Problem (Resolved):    NSTEMI (non-ST elevated myocardial infarction) (HCC) (POA: Yes)  Active Problems:    HTN (hypertension) (POA: Yes)    HLD (hyperlipidemia) (POA: Yes)    BMI 32.0-32.9,adult (POA: Yes)    Prediabetes (POA: Yes)  Resolved Problems:    NSTEMI, initial episode of care (HCC) (POA: Yes)      FOLLOW UP  Future Appointments   Date Time Provider Department Center   12/2/2024  2:30 PM DAVID [facility] None     Ev Verma M.D.  [id number]  [facility] [id number] [phone]    Schedule an appointment as soon as possible for a visit in 1 week(s)      [facility]  [address]  [address]  [facility] [facility] [address] [phone]  Call  Your doctor has referred you for Cardiac Rehab which is important in your recovery. Please call to make an appointment.      MEDICATIONS ON DISCHARGE     Medication List        START taking these medications        Instructions   metoprolol tartrate 50 MG Tabs  Commonly known as:  Lopressor   Take 1 Tablet by mouth 2 times a day.  Dose: 50 mg     prasugrel 10 MG Tabs  Start taking on: November 6, 2024  Commonly known as: Effient   Take 1 Tablet by mouth every day.  Dose: 10 mg            CHANGE how you take these medications        Instructions   atorvastatin 80 MG tablet  What changed:   medication strength  how much to take  Commonly known as: Lipitor   Take 1 Tablet by mouth every day.  Dose: 80 mg            CONTINUE taking these medications        Instructions   albuterol 108 (90 Base) MCG/ACT Aers inhalation aerosol   Inhale 1-2 Puffs every 6 hours as needed for Shortness of Breath.  Dose: 1-2 Puff     aspirin 81 MG EC tablet   Take 81 mg by mouth every day.  Dose: 81 mg     losartan 50 MG Tabs  Commonly known as: Cozaar   Take 50 mg by mouth every evening.  Dose: 50 mg     montelukast 10 MG Tabs  Commonly known as: Singulair   Take 10 mg by mouth every day.  Dose: 10 mg            STOP taking these medications      ibuprofen 200 MG Tabs  Commonly known as: Motrin              Allergies  Allergies   Allergen Reactions    Lisinopril Cough    Phenobarbital        DIET  No orders of the defined types were placed in this encounter.      ACTIVITY  As tolerated.  Weight bearing as tolerated    CONSULTATIONS  Cardiology     PROCEDURES  Cardiac catheterization    LABORATORY  Lab Results   Component Value Date    SODIUM 137 11/05/2024    POTASSIUM 3.5 (L) 11/05/2024    CHLORIDE 106 11/05/2024    CO2 20 11/05/2024    GLUCOSE 113 (H) 11/05/2024    BUN 14 11/05/2024    CREATININE 0.75 11/05/2024        Lab Results   Component Value Date    WBC 8.9 11/05/2024    HEMOGLOBIN 14.0 11/05/2024    HEMATOCRIT 39.9 (L) 11/05/2024    PLATELETCT 158 (L) 11/05/2024        Total time of the discharge process exceeds 32 minutes.

## 2024-12-02 ENCOUNTER — OFFICE VISIT (OUTPATIENT)
Dept: CARDIOLOGY | Facility: MEDICAL CENTER | Age: 64
End: 2024-12-02
Attending: NURSE PRACTITIONER
Payer: COMMERCIAL

## 2024-12-02 VITALS
OXYGEN SATURATION: 95 % | RESPIRATION RATE: 16 BRPM | HEART RATE: 74 BPM | BODY MASS INDEX: 31.64 KG/M2 | DIASTOLIC BLOOD PRESSURE: 62 MMHG | HEIGHT: 70 IN | WEIGHT: 221 LBS | SYSTOLIC BLOOD PRESSURE: 102 MMHG

## 2024-12-02 DIAGNOSIS — E78.2 MIXED HYPERLIPIDEMIA: ICD-10-CM

## 2024-12-02 DIAGNOSIS — I10 PRIMARY HYPERTENSION: ICD-10-CM

## 2024-12-02 DIAGNOSIS — I25.10 CORONARY ARTERY DISEASE INVOLVING NATIVE CORONARY ARTERY OF NATIVE HEART WITHOUT ANGINA PECTORIS: ICD-10-CM

## 2024-12-02 DIAGNOSIS — I21.4 NSTEMI (NON-ST ELEVATED MYOCARDIAL INFARCTION) (HCC): ICD-10-CM

## 2024-12-02 DIAGNOSIS — Z95.5 S/P DRUG ELUTING CORONARY STENT PLACEMENT: ICD-10-CM

## 2024-12-02 PROBLEM — N17.9 AKI (ACUTE KIDNEY INJURY) (HCC): Status: RESOLVED | Noted: 2024-11-04 | Resolved: 2024-12-02

## 2024-12-02 PROCEDURE — 99214 OFFICE O/P EST MOD 30 MIN: CPT | Performed by: NURSE PRACTITIONER

## 2024-12-02 PROCEDURE — 3074F SYST BP LT 130 MM HG: CPT | Performed by: NURSE PRACTITIONER

## 2024-12-02 PROCEDURE — 99212 OFFICE O/P EST SF 10 MIN: CPT | Performed by: NURSE PRACTITIONER

## 2024-12-02 PROCEDURE — 3078F DIAST BP <80 MM HG: CPT | Performed by: NURSE PRACTITIONER

## 2024-12-02 RX ORDER — PRASUGREL 10 MG/1
10 TABLET, FILM COATED ORAL DAILY
Qty: 100 TABLET | Refills: 3 | Status: SHIPPED | OUTPATIENT
Start: 2024-12-02

## 2024-12-02 RX ORDER — METOPROLOL TARTRATE 25 MG/1
25 TABLET, FILM COATED ORAL 2 TIMES DAILY
Qty: 200 TABLET | Refills: 3 | Status: SHIPPED | OUTPATIENT
Start: 2024-12-02

## 2024-12-02 RX ORDER — ATORVASTATIN CALCIUM 80 MG/1
80 TABLET, FILM COATED ORAL DAILY
Qty: 100 TABLET | Refills: 3 | Status: SHIPPED | OUTPATIENT
Start: 2024-12-02

## 2024-12-02 RX ORDER — LOSARTAN POTASSIUM 50 MG/1
50 TABLET ORAL NIGHTLY
Qty: 100 TABLET | Refills: 3 | Status: SHIPPED | OUTPATIENT
Start: 2024-12-02

## 2024-12-02 ASSESSMENT — ENCOUNTER SYMPTOMS
NAUSEA: 0
SHORTNESS OF BREATH: 0
PALPITATIONS: 0
HEADACHES: 0
LOSS OF CONSCIOUSNESS: 0
ABDOMINAL PAIN: 0
FEVER: 0
DIZZINESS: 0
ORTHOPNEA: 0
INSOMNIA: 0
MYALGIAS: 0
BRUISES/BLEEDS EASILY: 0
COUGH: 0
CHILLS: 0
PND: 0

## 2024-12-02 NOTE — PROGRESS NOTES
Chief Complaint   Patient presents with    Hospital Follow-up    MI (Non ST Segment Elevation MI)    Coronary Artery Disease    HTN (Controlled)    Hyperlipidemia       Subjective     Mode Wilbert Zhao is a 64 y.o. male who presents today for hospital follow-up of NSTEMI, and PCI/BEAN x 2 to the LAD.    Mode is a 64 year old male with history of HTN and hyperlipidemia (both treated), who first presented to [facility] ER (from [place]) with chest pain. He did have elevated troponins, and was transferred to [facility].    He was taken to the cath lab, and had PCI/BEAN x 2 to the LAD; echocardiogram showed LVEF 55% and normal wall motion. Medical therapy was adjusted with addition of ASA, Effient (x 12 months) and high intensity statin.    He is here today for hospital follow-up. He is participating in cardiac rehab in [place], and doing well. He is walking almost 30 minutes each day. He denies any further chest pain, pressure, tightness or discomfort; no palpitations; no shortness of breath, orthopnea or PND; occasional/mild dizziness when exercising, but no syncope or near syncope; no LE edema. He sleeps well. He is compliant with his medicaitons.    Cardiovascular Risk Factors:  1. Smoking status: nonsmoker  2. Type II Diabetes Mellitus: prediabetes  3. Hypertension: yes, treated  4. Dyslipidemia: yes, treated  5. Family history of early Coronary Artery Disease in a first degree relative (Male less than 55 years of age; Female less than 65 years of age): yes (maternal GF)  6.  Obesity and/or Metabolic Syndrome: BMI 31.71  7. Sedentary lifestyle: exercises regularly  8. EtOH use: sober since age 30     Past Medical History:   Diagnosis Date    CAD (coronary artery disease)     Hyperlipidemia     Hypertension     S/P drug eluting coronary stent placement 11/2024    NSTEMI. PCI/BEAN (Synergy Megatron 4.5 x 32mm - mid LAD; Synergy Megatron 3.5 x 20mm - distal LAD).     History reviewed. No pertinent surgical history.  History  reviewed. No pertinent family history.  Social History     Socioeconomic History    Marital status:      Spouse name: Not on file    Number of children: Not on file    Years of education: Not on file    Highest education level: Not on file   Occupational History    Not on file   Tobacco Use    Smoking status: Never    Smokeless tobacco: Never   Substance and Sexual Activity    Alcohol use: Not on file    Drug use: Not on file    Sexual activity: Not on file   Other Topics Concern    Not on file   Social History Narrative    Not on file     Social Drivers of Health     Financial Resource Strain: Not on File (8/24/2019)    Received from [employer]    Financial Resource Strain     Financial Resource Strain: 0   Food Insecurity: No Food Insecurity (11/3/2024)    Hunger Vital Sign     Worried About Running Out of Food in the Last Year: Never true     Ran Out of Food in the Last Year: Never true   Transportation Needs: No Transportation Needs (11/3/2024)    PRAPARE - Transportation     Lack of Transportation (Medical): No     Lack of Transportation (Non-Medical): No   Physical Activity: Not on File (8/24/2019)    Received from [employer]    Physical Activity     Physical Activity: 0   Stress: Not on File (8/24/2019)    Received from [employer]    Stress     Stress: 0   Social Connections: Not on File (8/24/2019)    Received from [employer]    Social Connections     Social Connections and Isolation: 0   Intimate Partner Violence: Not At Risk (11/3/2024)    Humiliation, Afraid, Rape, and Kick questionnaire     Fear of Current or Ex-Partner: No     Emotionally Abused: No     Physically Abused: No     Sexually Abused: No   Housing Stability: Low Risk  (11/3/2024)    Housing Stability Vital Sign     Unable to Pay for Housing in the Last Year: No     Number of Times Moved in the Last Year: 1     Homeless in the Last Year: No     Allergies   Allergen Reactions    Lisinopril Cough    Phenobarbital      Outpatient Encounter Medications as of  "12/2/2024   Medication Sig Dispense Refill    atorvastatin (LIPITOR) 80 MG tablet Take 1 Tablet by mouth every day. 100 Tablet 3    losartan (COZAAR) 50 MG Tab Take 1 Tablet by mouth every evening. 100 Tablet 3    metoprolol tartrate (LOPRESSOR) 25 MG Tab Take 1 Tablet by mouth 2 times a day. 200 Tablet 3    prasugrel (EFFIENT) 10 MG Tab Take 1 Tablet by mouth every day. 100 Tablet 3    albuterol 108 (90 Base) MCG/ACT Aero Soln inhalation aerosol Inhale 1-2 Puffs every 6 hours as needed for Shortness of Breath.      montelukast (SINGULAIR) 10 MG Tab Take 10 mg by mouth every day.      aspirin 81 MG EC tablet Take 81 mg by mouth every day.      [DISCONTINUED] atorvastatin (LIPITOR) 80 MG tablet Take 1 Tablet by mouth every day. 30 Tablet 0    [DISCONTINUED] metoprolol tartrate (LOPRESSOR) 50 MG Tab Take 1 Tablet by mouth 2 times a day. 60 Tablet 0    [DISCONTINUED] prasugrel (EFFIENT) 10 MG Tab Take 1 Tablet by mouth every day. 30 Tablet 0    [DISCONTINUED] losartan (COZAAR) 50 MG Tab Take 50 mg by mouth every evening.       No facility-administered encounter medications on file as of 12/2/2024.     Review of Systems   Constitutional:  Negative for chills and fever.   HENT:  Negative for congestion.    Respiratory:  Negative for cough and shortness of breath.    Cardiovascular:  Negative for chest pain, palpitations, orthopnea, leg swelling and PND.   Gastrointestinal:  Negative for abdominal pain and nausea.   Musculoskeletal:  Negative for myalgias.   Skin:  Negative for rash.   Neurological:  Negative for dizziness, loss of consciousness and headaches.   Endo/Heme/Allergies:  Does not bruise/bleed easily.   Psychiatric/Behavioral:  The patient does not have insomnia.               Objective     /62 (BP Location: Left arm, Patient Position: Sitting, BP Cuff Size: Adult)   Pulse 74   Resp 16   Ht 1.778 m (5' 10\")   Wt 100 kg (221 lb)   SpO2 95%   BMI 31.71 kg/m²     Physical Exam  Constitutional:       " Appearance: He is well-developed.   HENT:      Head: Normocephalic.   Neck:      Vascular: No JVD.   Cardiovascular:      Rate and Rhythm: Normal rate and regular rhythm.      Heart sounds: Normal heart sounds.   Pulmonary:      Effort: Pulmonary effort is normal. No respiratory distress.      Breath sounds: Normal breath sounds. No wheezing or rales.   Abdominal:      General: Bowel sounds are normal. There is no distension.      Palpations: Abdomen is soft.      Tenderness: There is no abdominal tenderness.   Musculoskeletal:         General: Normal range of motion.      Cervical back: Normal range of motion and neck supple.   Skin:     General: Skin is warm and dry.      Findings: No rash.   Neurological:      Mental Status: He is alert and oriented to person, place, and time.   Psychiatric:         Mood and Affect: Mood normal.         Behavior: Behavior normal.       Procedures: [facility] of 11/4/2024:  Coronary arteriograms  Left heart catheterization  Angioplasty and placement of a 4.0 by 32 mm, 3.5 by 20 mm Synergy Megatraon drug-eluting stents in mid left anterior descending coronary artery.      CONCLUSIONS OF [facility] OF 11/4/2024:  No prior study is available for comparison.      Mild concentric left ventricular hypertrophy.  Normal left ventricular systolic function.  The left ventricular ejection fraction is visually estimated to be 55%.  The right ventricle is mildly dilated. Normal right ventricular   systolic function.  Mild aortic insufficiency.  Mild tricuspid regurgitation.  The ascending aorta diameter is 3.7 cm.    Lab Results   Component Value Date/Time    CHOLSTRLTOT 161 11/04/2024 01:31 AM    LDL 88 11/04/2024 01:31 AM    HDL 47 11/04/2024 01:31 AM    TRIGLYCERIDE 131 11/04/2024 01:31 AM        Lab Results   Component Value Date/Time    ASTSGOT 17 09/14/2017 06:15 AM    ALTSGPT 24 09/14/2017 06:15 AM       Lab Results   Component Value Date/Time    SODIUM 137 11/05/2024 03:00 AM    POTASSIUM 3.5 (L)  11/05/2024 03:00 AM    CHLORIDE 106 11/05/2024 03:00 AM    CO2 20 11/05/2024 03:00 AM    GLUCOSE 113 (H) 11/05/2024 03:00 AM    BUN 14 11/05/2024 03:00 AM    CREATININE 0.75 11/05/2024 03:00 AM      Assessment & Plan     1. NSTEMI (non-ST elevated myocardial infarction) (HCC)  atorvastatin (LIPITOR) 80 MG tablet    losartan (COZAAR) 50 MG Tab    metoprolol tartrate (LOPRESSOR) 25 MG Tab    prasugrel (EFFIENT) 10 MG Tab      2. Coronary artery disease involving native coronary artery of native heart without angina pectoris        3. S/P drug eluting coronary stent placement        4. Primary hypertension        5. Mixed hyperlipidemia  Lipid Profile          Medical Decision Making: Today's Assessment/Status/Plan:      1. Recent NSTEMI, with PCI/BEAN x 2 to the LAD. He is doing well and already participating in ICR in [place]. We discussed risk factor modification and listening to his body when exercising. Continue:  ASA 81mg once daily  Effient 10mg once daily (x 12 months)  Metoprolol 25mg twice daily (reduce from 50mg twice daily)  Losartan 50mg once daily  Lipitor 80mg once daily  NTG 0.4mg SL PRN - we discussed usage    2. Hypertension, treated with Metoprolol and Losartan. As above, to reduce Metoprolol from 50mg to 25mg twice daily. Monitor BP at home.    3. Hyperlipidemia, treated with Lipitor 80mg. Last LDL in the hospital was 88; goal is <70, ideally closer to 50. To repeat fasting lipid panel.    He is doing well, and remains stable.   We reviewed [facility] and echocardiogram results.  Same medications, which are renewed x 12 months.  Continue with ICR in [place].    Follow-up in 3 months, sooner if clinical condition changes.    He is also sent a [employer] message, if he has any questions.

## 2025-03-11 ENCOUNTER — APPOINTMENT (OUTPATIENT)
Dept: CARDIOLOGY | Facility: MEDICAL CENTER | Age: 65
End: 2025-03-11
Attending: NURSE PRACTITIONER
Payer: COMMERCIAL

## 2025-05-30 ENCOUNTER — TELEPHONE (OUTPATIENT)
Dept: HEALTH INFORMATION MANAGEMENT | Facility: OTHER | Age: 65
End: 2025-05-30
Payer: COMMERCIAL